# Patient Record
Sex: FEMALE | Race: WHITE | Employment: FULL TIME | URBAN - METROPOLITAN AREA
[De-identification: names, ages, dates, MRNs, and addresses within clinical notes are randomized per-mention and may not be internally consistent; named-entity substitution may affect disease eponyms.]

---

## 2019-10-03 ENCOUNTER — OP HISTORICAL/CONVERTED ENCOUNTER (OUTPATIENT)
Dept: OTHER | Age: 49
End: 2019-10-03

## 2019-10-22 ENCOUNTER — OP HISTORICAL/CONVERTED ENCOUNTER (OUTPATIENT)
Dept: OTHER | Age: 49
End: 2019-10-22

## 2020-04-16 ENCOUNTER — OP HISTORICAL/CONVERTED ENCOUNTER (OUTPATIENT)
Dept: OTHER | Age: 50
End: 2020-04-16

## 2020-09-12 VITALS — HEIGHT: 67 IN | BODY MASS INDEX: 21.46 KG/M2

## 2020-09-12 PROBLEM — L98.9 LESION OF NECK: Status: ACTIVE | Noted: 2020-09-12

## 2020-09-12 PROBLEM — N92.6 IRREGULAR PERIODS: Status: ACTIVE | Noted: 2020-09-12

## 2020-09-12 PROBLEM — N88.8 CERVICAL CYST: Status: ACTIVE | Noted: 2020-09-12

## 2020-09-12 PROBLEM — N87.9 CERVICAL DYSPLASIA: Status: ACTIVE | Noted: 2020-09-12

## 2020-09-12 PROBLEM — N81.4 UTERINE PROLAPSE: Status: ACTIVE | Noted: 2020-09-12

## 2020-09-15 VITALS — HEIGHT: 67 IN | BODY MASS INDEX: 21.46 KG/M2

## 2020-09-16 ENCOUNTER — OFFICE VISIT (OUTPATIENT)
Dept: OBGYN CLINIC | Age: 50
End: 2020-09-16
Payer: COMMERCIAL

## 2020-09-16 VITALS
HEIGHT: 67 IN | DIASTOLIC BLOOD PRESSURE: 80 MMHG | BODY MASS INDEX: 22.99 KG/M2 | WEIGHT: 146.5 LBS | SYSTOLIC BLOOD PRESSURE: 148 MMHG

## 2020-09-16 DIAGNOSIS — Z12.31 VISIT FOR SCREENING MAMMOGRAM: Primary | ICD-10-CM

## 2020-09-16 DIAGNOSIS — N95.1 PERIMENOPAUSE: ICD-10-CM

## 2020-09-16 DIAGNOSIS — Z12.4 SCREENING FOR MALIGNANT NEOPLASM OF CERVIX: Primary | ICD-10-CM

## 2020-09-16 DIAGNOSIS — Z01.419 ROUTINE GYNECOLOGICAL EXAMINATION: ICD-10-CM

## 2020-09-16 PROCEDURE — 77067 SCR MAMMO BI INCL CAD: CPT | Performed by: OBSTETRICS & GYNECOLOGY

## 2020-09-16 PROCEDURE — 77063 BREAST TOMOSYNTHESIS BI: CPT | Performed by: OBSTETRICS & GYNECOLOGY

## 2020-09-16 PROCEDURE — 99396 PREV VISIT EST AGE 40-64: CPT | Performed by: OBSTETRICS & GYNECOLOGY

## 2020-09-16 NOTE — PROGRESS NOTES
Humaira Mo is a , 48 y.o. female   Patient's last menstrual period was 2020. She presents for her annual    She is having no significant problems. Menstrual status:  Her periods are: normal. Cycles are: often irregular with no apparent pattern. She does not have dysmenorrhea. Having some menopausal sxs    Medical conditions:  Since her last annual GYN exam about one year ago, she has not the following changes in her health history: none. Past Medical History:   Diagnosis Date    Cervical cyst 2020    Cervical dysplasia 2020    HTN (hypertension)     Irregular periods 2020    Lesion of neck 2020    Uterine prolapse 2020     Past Surgical History:   Procedure Laterality Date    HX GYN      colpo    HX GYN      cryo    HX TONSIL AND ADENOIDECTOMY         Prior to Admission medications    Medication Sig Start Date End Date Taking? Authorizing Provider   labetalol (NORMODYNE) 100 mg tablet Take 1 Tab by mouth every twelve (12) hours. 10/1/11  Yes Deepthi Cao MD   triamterene-hydrochlorothiazide (DYAZIDE) 37.5-25 mg per capsule Take  by mouth daily. Yes Provider, Historical   oxycodone-acetaminophen (PERCOCET) 5-325 mg per tablet Take 1 Tab by mouth every four (4) hours as needed. 10/1/11   Asehly Rayo MD       Allergies   Allergen Reactions    Ace Inhibitors Angioedema          Tobacco History:  reports that she has never smoked. She has never used smokeless tobacco.  Alcohol Abuse:  reports previous alcohol use. Drug Abuse:  reports no history of drug use. Family Medical/Cancer History:   Family History   Problem Relation Age of Onset    Breast Cancer Other     Hypertension Other     Diabetes Other     Colon Cancer Other           Review of Systems   Constitutional: Negative for chills, fever, malaise/fatigue and weight loss. HENT: Negative for congestion, ear pain, sinus pain and tinnitus.     Eyes: Negative for blurred vision and double vision. Respiratory: Negative for cough, shortness of breath and wheezing. Cardiovascular: Negative for chest pain and palpitations. Gastrointestinal: Negative for abdominal pain, blood in stool, constipation, diarrhea, heartburn, nausea and vomiting. Genitourinary: Negative for dysuria, flank pain, frequency, hematuria and urgency. Musculoskeletal: Negative for joint pain and myalgias. Skin: Negative for itching and rash. Neurological: Negative for dizziness, weakness and headaches. Psychiatric/Behavioral: Negative for depression, memory loss and suicidal ideas. The patient is not nervous/anxious and does not have insomnia. Physical Exam  Constitutional:       Appearance: Normal appearance. HENT:      Head: Normocephalic and atraumatic. Cardiovascular:      Rate and Rhythm: Normal rate. Heart sounds: Normal heart sounds. Pulmonary:      Effort: Pulmonary effort is normal.      Breath sounds: Normal breath sounds. Chest:      Breasts:         Right: Normal.         Left: Normal.   Abdominal:      General: Abdomen is flat. Palpations: Abdomen is soft. Genitourinary:     General: Normal vulva. Vagina: Normal.      Cervix: Normal.      Uterus: Normal.       Adnexa: Right adnexa normal and left adnexa normal.      Rectum: Normal.      Comments: PAP Obtained  Neurological:      Mental Status: She is alert. Psychiatric:         Mood and Affect: Mood normal.         Behavior: Behavior normal.         Thought Content: Thought content normal.          Visit Vitals  BP (!) 148/80 (BP 1 Location: Left arm, BP Patient Position: Sitting)   Ht 5' 6.5\" (1.689 m)   Wt 146 lb 8 oz (66.5 kg)   LMP 08/19/2020   BMI 23.29 kg/m²         Assessment:  Diagnoses and all orders for this visit:    1. Screening for malignant neoplasm of cervix  -     PAP, LB, RFX HPV [id number])    2. Routine gynecological examination  -     PAP, LB, RFX HPV [id number])    3. Perimenopause        Plan:Questions addressed  Counseled re: diet, exercise, healthy lifestyle  Return for Annual  Rec annual mammogram        Follow-up and Dispositions    · Return for 1 yr annual, 1 yr mammo.

## 2020-09-23 LAB
CYTOLOGIST CVX/VAG CYTO: NORMAL
CYTOLOGY CVX/VAG DOC CYTO: NORMAL
DX ICD CODE: NORMAL
LABCORP, 190119: NORMAL
Lab: NORMAL
OTHER STN SPEC: NORMAL
STAT OF ADQ CVX/VAG CYTO-IMP: NORMAL

## 2020-11-04 ENCOUNTER — TRANSCRIBE ORDER (OUTPATIENT)
Dept: SCHEDULING | Age: 50
End: 2020-11-04

## 2020-11-04 DIAGNOSIS — R59.0 VIRCHOW'S NODE: Primary | ICD-10-CM

## 2022-03-18 PROBLEM — L98.9 LESION OF NECK: Status: ACTIVE | Noted: 2020-09-12

## 2022-03-18 PROBLEM — N81.4 UTERINE PROLAPSE: Status: ACTIVE | Noted: 2020-09-12

## 2022-03-19 PROBLEM — N92.6 IRREGULAR PERIODS: Status: ACTIVE | Noted: 2020-09-12

## 2022-03-20 PROBLEM — N88.8 CERVICAL CYST: Status: ACTIVE | Noted: 2020-09-12

## 2022-03-20 PROBLEM — N87.9 CERVICAL DYSPLASIA: Status: ACTIVE | Noted: 2020-09-12

## 2022-07-01 ENCOUNTER — OFFICE VISIT (OUTPATIENT)
Dept: OBGYN CLINIC | Age: 52
End: 2022-07-01
Payer: COMMERCIAL

## 2022-07-01 VITALS
DIASTOLIC BLOOD PRESSURE: 70 MMHG | WEIGHT: 146.38 LBS | BODY MASS INDEX: 22.98 KG/M2 | SYSTOLIC BLOOD PRESSURE: 138 MMHG | HEIGHT: 67 IN

## 2022-07-01 DIAGNOSIS — N95.1 PERIMENOPAUSAL: ICD-10-CM

## 2022-07-01 DIAGNOSIS — N95.1 MENOPAUSAL SYNDROME: ICD-10-CM

## 2022-07-01 DIAGNOSIS — Z12.4 SCREENING FOR MALIGNANT NEOPLASM OF CERVIX: Primary | ICD-10-CM

## 2022-07-01 DIAGNOSIS — Z01.419 ROUTINE GYNECOLOGICAL EXAMINATION: ICD-10-CM

## 2022-07-01 PROCEDURE — 99396 PREV VISIT EST AGE 40-64: CPT | Performed by: OBSTETRICS & GYNECOLOGY

## 2022-07-01 NOTE — PROGRESS NOTES
Kellie Soriano is a , 46 y.o. female   Patient's last menstrual period was 2022 (exact date). She presents for her annual    She is having no significant problems. DWP perimenopausal sxs- skipping some cycles    Had CT scan recently- results reviewed- mentioned cyst in cervical region- DWP c/w nabothian cysts      Menstrual status:  Cycles are usually regular with a 26-32 day interval with 3-7 day duration. Flow: light. She does not have dysmenorrhea. Medical conditions:  Since her last annual GYN exam about one year ago, she has not the following changes in her health history: none. Mammogram History:    BLANK Results (most recent):  Results from Office Visit encounter on 20    BLANK 3D JAREK W MAMMO BI SCREENING INCL CAD       DEXA Results (most recent):  No results found for this or any previous visit. Past Medical History:   Diagnosis Date    Cervical cyst 2020    Cervical dysplasia 2020    HTN (hypertension)     Irregular periods 2020    Lesion of neck 2020    Uterine prolapse 2020     Past Surgical History:   Procedure Laterality Date    HX GYN      colpo    HX GYN      cryo    HX TONSIL AND ADENOIDECTOMY         Prior to Admission medications    Medication Sig Start Date End Date Taking? Authorizing Provider   triamterene-hydrochlorothiazide (DYAZIDE) 37.5-25 mg per capsule Take  by mouth daily. Yes Provider, Historical   labetalol (NORMODYNE) 100 mg tablet Take 1 Tab by mouth every twelve (12) hours. 10/1/11   Esteban Raza MD       Allergies   Allergen Reactions    Ace Inhibitors Angioedema          Tobacco History:  reports that she has never smoked. She has never used smokeless tobacco.  Alcohol use:  reports previous alcohol use. Drug use:  reports no history of drug use.     Family Medical/Cancer History:   Family History   Problem Relation Age of Onset    Breast Cancer Other     Hypertension Other     Diabetes Other     Colon Cancer Other           Review of Systems   Constitutional: Negative for chills, fever, malaise/fatigue and weight loss. HENT: Negative for congestion, ear pain, sinus pain and tinnitus. Eyes: Negative for blurred vision and double vision. Respiratory: Negative for cough, shortness of breath and wheezing. Cardiovascular: Negative for chest pain and palpitations. Gastrointestinal: Negative for abdominal pain, blood in stool, constipation, diarrhea, heartburn, nausea and vomiting. Genitourinary: Negative for dysuria, flank pain, frequency, hematuria and urgency. Musculoskeletal: Negative for joint pain and myalgias. Skin: Negative for itching and rash. Neurological: Negative for dizziness, weakness and headaches. Psychiatric/Behavioral: Negative for depression, memory loss and suicidal ideas. The patient is not nervous/anxious and does not have insomnia. Physical Exam  Constitutional:       Appearance: Normal appearance. HENT:      Head: Normocephalic and atraumatic. Cardiovascular:      Rate and Rhythm: Normal rate. Heart sounds: Normal heart sounds. Pulmonary:      Effort: Pulmonary effort is normal.      Breath sounds: Normal breath sounds. Chest:   Breasts:      Right: Normal.      Left: Normal.       Abdominal:      General: Abdomen is flat. Palpations: Abdomen is soft. Genitourinary:     General: Normal vulva. Vagina: Normal.      Cervix: Normal.      Uterus: Normal.       Adnexa: Right adnexa normal and left adnexa normal.      Rectum: Normal.      Comments: PAP Obtained  Neurological:      Mental Status: She is alert. Psychiatric:         Mood and Affect: Mood normal.         Behavior: Behavior normal.         Thought Content:  Thought content normal.          Visit Vitals  /70 (BP 1 Location: Left upper arm, BP Patient Position: Sitting, BP Cuff Size: Small adult)   Ht 5' 6.5\" (1.689 m)   Wt 146 lb 6 oz (66.4 kg)   LMP 07/01/2022 (Exact Date)   BMI 23.27 kg/m²         Assessment:   Diagnoses and all orders for this visit:    1. Screening for malignant neoplasm of cervix  -     PAP IG, RFX APTIMA HPV ASCUS ([id number])    2. Routine gynecological examination  -     PAP IG, RFX APTIMA HPV ASCUS ([id number])    3. Menopausal syndrome    4. Perimenopausal        Plan: Questions addressed  Counseled re: diet, exercise, healthy lifestyle  Return for Annual  Rec annual mammogram        Follow-up and Dispositions    · Return for Mammogram, 1 yr annual, 1 yr mammo.

## 2022-07-01 NOTE — PROGRESS NOTES
Chief Complaint   Patient presents with    Annual Exam     Mammo alexia here on 7-06-22     Visit Vitals  /70 (BP 1 Location: Left upper arm, BP Patient Position: Sitting, BP Cuff Size: Small adult)   Ht 5' 6.5\" (1.689 m)   Wt 146 lb 6 oz (66.4 kg)   LMP 07/01/2022 (Exact Date)   BMI 23.27 kg/m²

## 2022-07-08 LAB
CYTOLOGIST CVX/VAG CYTO: NORMAL
CYTOLOGY CVX/VAG DOC CYTO: NORMAL
CYTOLOGY CVX/VAG DOC THIN PREP: NORMAL
DX ICD CODE: NORMAL
LABCORP, 190119: NORMAL
Lab: NORMAL
OTHER STN SPEC: NORMAL
STAT OF ADQ CVX/VAG CYTO-IMP: NORMAL

## 2024-08-04 ENCOUNTER — APPOINTMENT (OUTPATIENT)
Facility: HOSPITAL | Age: 54
DRG: 275 | End: 2024-08-04
Payer: COMMERCIAL

## 2024-08-04 ENCOUNTER — APPOINTMENT (OUTPATIENT)
Facility: HOSPITAL | Age: 54
DRG: 275 | End: 2024-08-04
Attending: INTERNAL MEDICINE
Payer: COMMERCIAL

## 2024-08-04 ENCOUNTER — HOSPITAL ENCOUNTER (INPATIENT)
Facility: HOSPITAL | Age: 54
LOS: 10 days | Discharge: HOME | DRG: 275 | End: 2024-08-14
Attending: EMERGENCY MEDICINE | Admitting: INTERNAL MEDICINE
Payer: COMMERCIAL

## 2024-08-04 DIAGNOSIS — I46.9 CARDIAC ARREST WITH SUCCESSFUL RESUSCITATION (HCC): Primary | ICD-10-CM

## 2024-08-04 DIAGNOSIS — R60.0 EDEMA AT INJECTION SITE: ICD-10-CM

## 2024-08-04 DIAGNOSIS — I42.9 CARDIOMYOPATHY, UNSPECIFIED TYPE (HCC): ICD-10-CM

## 2024-08-04 DIAGNOSIS — R00.0 TACHYCARDIA: ICD-10-CM

## 2024-08-04 DIAGNOSIS — I47.20 V-TACH (HCC): ICD-10-CM

## 2024-08-04 DIAGNOSIS — I21.3 STEMI (ST ELEVATION MYOCARDIAL INFARCTION) (HCC): ICD-10-CM

## 2024-08-04 DIAGNOSIS — I50.21 ACUTE SYSTOLIC CONGESTIVE HEART FAILURE (HCC): ICD-10-CM

## 2024-08-04 PROBLEM — I49.01 CARDIAC ARREST WITH VENTRICULAR FIBRILLATION (HCC): Status: ACTIVE | Noted: 2024-08-04

## 2024-08-04 LAB
ABO + RH BLD: NORMAL
ACT BLD: 97 SEC (ref 74–125)
ALBUMIN SERPL-MCNC: 3 G/DL (ref 3.5–5)
ALBUMIN/GLOB SERPL: 0.8 (ref 1.1–2.2)
ALP SERPL-CCNC: 155 U/L (ref 45–117)
ALT SERPL-CCNC: 445 U/L (ref 12–78)
ANION GAP SERPL CALC-SCNC: 12 MMOL/L (ref 5–15)
APPEARANCE UR: ABNORMAL
ARTERIAL PATENCY WRIST A: ABNORMAL
AST SERPL W P-5'-P-CCNC: 509 U/L (ref 15–37)
BACTERIA URNS QL MICRO: NEGATIVE /HPF
BASE DEFICIT BLD-SCNC: 8.4 MMOL/L
BASE DEFICIT BLDA-SCNC: 7.5 MMOL/L
BASOPHILS # BLD: 0.1 K/UL (ref 0–0.1)
BASOPHILS NFR BLD: 1 % (ref 0–1)
BDY SITE: ABNORMAL
BILIRUB SERPL-MCNC: 0.6 MG/DL (ref 0.2–1)
BILIRUB UR QL: NEGATIVE
BLOOD GROUP ANTIBODIES SERPL: NEGATIVE
BNP SERPL-MCNC: 1208 PG/ML
BUN SERPL-MCNC: 17 MG/DL (ref 6–20)
BUN/CREAT SERPL: 15 (ref 12–20)
CA-I BLD-MCNC: 1 MMOL/L (ref 1.12–1.32)
CA-I BLD-MCNC: 8.5 MG/DL (ref 8.5–10.1)
CHLORIDE BLD-SCNC: 107 MMOL/L (ref 98–107)
CHLORIDE SERPL-SCNC: 107 MMOL/L (ref 97–108)
CO2 BLD-SCNC: 18 MMOL/L
CO2 SERPL-SCNC: 18 MMOL/L (ref 21–32)
COHGB MFR BLD: 0.3 % (ref 1–2)
COLOR UR: ABNORMAL
CREAT SERPL-MCNC: 1.17 MG/DL (ref 0.7–1.3)
CREAT UR-MCNC: 0.88 MG/DL (ref 0.6–1.3)
DIFFERENTIAL METHOD BLD: ABNORMAL
ECHO AO ASC DIAM: 3.7 CM
ECHO AO ASCENDING AORTA INDEX: 1.96 CM/M2
ECHO AO ROOT DIAM: 3.8 CM
ECHO AO ROOT INDEX: 2.01 CM/M2
ECHO AR MAX VEL PISA: 3.8 M/S
ECHO AV AREA PEAK VELOCITY: 1.3 CM2
ECHO AV AREA VTI: 1.1 CM2
ECHO AV AREA/BSA PEAK VELOCITY: 0.7 CM2/M2
ECHO AV AREA/BSA VTI: 0.6 CM2/M2
ECHO AV MEAN GRADIENT: 8 MMHG
ECHO AV MEAN VELOCITY: 1.3 M/S
ECHO AV PEAK GRADIENT: 14 MMHG
ECHO AV PEAK VELOCITY: 1.9 M/S
ECHO AV REGURGITANT PHT: 556 MS
ECHO AV VELOCITY RATIO: 0.32
ECHO AV VTI: 23.9 CM
ECHO BSA: 1.92 M2
ECHO BSA: 1.92 M2
ECHO EST RA PRESSURE: 15 MMHG
ECHO LA AREA 4C: 22.4 CM2
ECHO LA MAJOR AXIS: 6.8 CM
ECHO LA VOL MOD A4C: 63 ML (ref 18–58)
ECHO LA VOLUME INDEX MOD A4C: 33 ML/M2 (ref 16–34)
ECHO LV E' LATERAL VELOCITY: 6 CM/S
ECHO LV E' SEPTAL VELOCITY: 4 CM/S
ECHO LV EDV A2C: 111 ML
ECHO LV EDV A4C: 124 ML
ECHO LV EDV INDEX A4C: 66 ML/M2
ECHO LV EDV NDEX A2C: 59 ML/M2
ECHO LV EJECTION FRACTION A2C: 54 %
ECHO LV EJECTION FRACTION A4C: 39 %
ECHO LV EJECTION FRACTION BIPLANE: 51 % (ref 55–100)
ECHO LV ESV A2C: 51 ML
ECHO LV ESV A4C: 75 ML
ECHO LV ESV INDEX A2C: 27 ML/M2
ECHO LV ESV INDEX A4C: 40 ML/M2
ECHO LV FRACTIONAL SHORTENING: 13 % (ref 28–44)
ECHO LV INTERNAL DIMENSION DIASTOLE INDEX: 3.33 CM/M2
ECHO LV INTERNAL DIMENSION DIASTOLIC: 6.3 CM (ref 4.2–5.9)
ECHO LV INTERNAL DIMENSION SYSTOLIC INDEX: 2.91 CM/M2
ECHO LV INTERNAL DIMENSION SYSTOLIC: 5.5 CM
ECHO LV IVSD: 1.5 CM (ref 0.6–1)
ECHO LV MASS 2D: 505.9 G (ref 88–224)
ECHO LV MASS INDEX 2D: 267.7 G/M2 (ref 49–115)
ECHO LV POSTERIOR WALL DIASTOLIC: 1.7 CM (ref 0.6–1)
ECHO LV RELATIVE WALL THICKNESS RATIO: 0.54
ECHO LVOT AREA: 4.2 CM2
ECHO LVOT AV VTI INDEX: 0.27
ECHO LVOT DIAM: 2.3 CM
ECHO LVOT MEAN GRADIENT: 1 MMHG
ECHO LVOT PEAK GRADIENT: 2 MMHG
ECHO LVOT PEAK VELOCITY: 0.6 M/S
ECHO LVOT STROKE VOLUME INDEX: 14.3 ML/M2
ECHO LVOT SV: 27 ML
ECHO LVOT VTI: 6.5 CM
ECHO MV E VELOCITY: 0.37 M/S
ECHO MV E/E' LATERAL: 6.17
ECHO MV E/E' RATIO (AVERAGED): 7.71
ECHO MV E/E' SEPTAL: 9.25
ECHO MV REGURGITANT PEAK GRADIENT: 21 MMHG
ECHO MV REGURGITANT PEAK VELOCITY: 2.3 M/S
ECHO MV REGURGITANT VTIA: 51.5 CM
ECHO PV MAX VELOCITY: 0.4 M/S
ECHO PV MEAN GRADIENT: 0 MMHG
ECHO PV MEAN VELOCITY: 0.3 M/S
ECHO PV PEAK GRADIENT: 1 MMHG
ECHO PV VTI: 5.7 CM
ECHO RA AREA 4C: 15.2 CM2
ECHO RA END SYSTOLIC VOLUME APICAL 4 CHAMBER INDEX BSA: 23 ML/M2
ECHO RA VOLUME: 43 ML
ECHO RIGHT VENTRICULAR SYSTOLIC PRESSURE (RVSP): 32 MMHG
ECHO RV BASAL DIMENSION: 3.4 CM
ECHO RV MID DIMENSION: 3.2 CM
ECHO TV REGURGITANT MAX VELOCITY: 2.06 M/S
ECHO TV REGURGITANT PEAK GRADIENT: 17 MMHG
EKG ATRIAL RATE: 149 BPM
EKG DIAGNOSIS: NORMAL
EKG P AXIS: 74 DEGREES
EKG P-R INTERVAL: 96 MS
EKG Q-T INTERVAL: 346 MS
EKG QRS DURATION: 152 MS
EKG QTC CALCULATION (BAZETT): 544 MS
EKG R AXIS: -68 DEGREES
EKG T AXIS: -80 DEGREES
EKG VENTRICULAR RATE: 149 BPM
EOSINOPHIL # BLD: 0.2 K/UL (ref 0–0.4)
EOSINOPHIL NFR BLD: 1 % (ref 0–7)
EPITH CASTS URNS QL MICRO: ABNORMAL /LPF
ERYTHROCYTE [DISTWIDTH] IN BLOOD BY AUTOMATED COUNT: 13.1 % (ref 11.5–14.5)
FIO2 ON VENT: 0.6 %
FLUAV RNA SPEC QL NAA+PROBE: NOT DETECTED
FLUBV RNA SPEC QL NAA+PROBE: NOT DETECTED
GAS FLOW.O2 SETTING OXYMISER: 16
GLOBULIN SER CALC-MCNC: 3.6 G/DL (ref 2–4)
GLUCOSE BLD STRIP.AUTO-MCNC: 161 MG/DL (ref 65–100)
GLUCOSE BLD STRIP.AUTO-MCNC: 175 MG/DL (ref 65–100)
GLUCOSE BLD STRIP.AUTO-MCNC: 286 MG/DL (ref 65–100)
GLUCOSE SERPL-MCNC: 254 MG/DL (ref 65–100)
GLUCOSE UR STRIP.AUTO-MCNC: 150 MG/DL
GRAN CASTS URNS QL MICRO: ABNORMAL /LPF
HCO3 BLD-SCNC: 18.1 MMOL/L (ref 19–28)
HCO3 BLDA-SCNC: 17 MMOL/L (ref 22–26)
HCT VFR BLD AUTO: 46.8 % (ref 36.6–50.3)
HGB BLD-MCNC: 15.2 G/DL (ref 12.1–17)
HGB UR QL STRIP: ABNORMAL
HYALINE CASTS URNS QL MICRO: ABNORMAL /LPF (ref 0–5)
IMM GRANULOCYTES # BLD AUTO: 0.2 K/UL (ref 0–0.04)
IMM GRANULOCYTES NFR BLD AUTO: 1 % (ref 0–0.5)
KETONES UR QL STRIP.AUTO: NEGATIVE MG/DL
LACTATE BLD-SCNC: 5.3 MMOL/L (ref 0.4–2)
LEUKOCYTE ESTERASE UR QL STRIP.AUTO: NEGATIVE
LYMPHOCYTES # BLD: 4.3 K/UL (ref 0.8–3.5)
LYMPHOCYTES NFR BLD: 30 % (ref 12–49)
MAGNESIUM SERPL-MCNC: 2.9 MG/DL (ref 1.6–2.4)
MCH RBC QN AUTO: 28.4 PG (ref 26–34)
MCHC RBC AUTO-ENTMCNC: 32.5 G/DL (ref 30–36.5)
MCV RBC AUTO: 87.3 FL (ref 80–99)
METHGB MFR BLD: 0.4 % (ref 0–1.4)
MONOCYTES # BLD: 0.5 K/UL (ref 0–1)
MONOCYTES NFR BLD: 3 % (ref 5–13)
MRSA DNA SPEC QL NAA+PROBE: NOT DETECTED
MUCOUS THREADS URNS QL MICRO: ABNORMAL /LPF
NEUTS SEG # BLD: 9.3 K/UL (ref 1.8–8)
NEUTS SEG NFR BLD: 64 % (ref 32–75)
NITRITE UR QL STRIP.AUTO: NEGATIVE
NRBC # BLD: 0 K/UL (ref 0–0.01)
NRBC BLD-RTO: 0 PER 100 WBC
OXYHGB MFR BLD: 98.3 % (ref 95–99)
PCO2 BLD: 39.5 MMHG (ref 35–45)
PCO2 BLDA: 34 MMHG (ref 35–45)
PEEP RESPIRATORY: 5
PERFORMED BY:: ABNORMAL
PERFORMED BY:: NORMAL
PH BLD: 7.27 (ref 7.35–7.45)
PH BLDA: 7.32 (ref 7.35–7.45)
PH UR STRIP: 7 (ref 5–8)
PLATELET # BLD AUTO: 378 K/UL (ref 150–400)
PMV BLD AUTO: 9.7 FL (ref 8.9–12.9)
PO2 BLD: 93 MMHG (ref 75–100)
PO2 BLDA: 231 MMHG (ref 80–100)
POTASSIUM BLD-SCNC: 4.9 MMOL/L (ref 3.5–5.5)
POTASSIUM SERPL-SCNC: 3.5 MMOL/L (ref 3.5–5.1)
PROT SERPL-MCNC: 6.6 G/DL (ref 6.4–8.2)
PROT UR STRIP-MCNC: 100 MG/DL
RBC # BLD AUTO: 5.36 M/UL (ref 4.1–5.7)
RBC #/AREA URNS HPF: ABNORMAL /HPF (ref 0–5)
SAO2 % BLD: 96 %
SAO2 % BLD: 99 % (ref 95–99)
SAO2% DEVICE SAO2% SENSOR NAME: ABNORMAL
SARS-COV-2 RNA RESP QL NAA+PROBE: NOT DETECTED
SODIUM BLD-SCNC: 140 MMOL/L (ref 136–145)
SODIUM SERPL-SCNC: 137 MMOL/L (ref 136–145)
SP GR UR REFRACTOMETRY: 1.02 (ref 1–1.03)
SPECIMEN EXP DATE BLD: NORMAL
SPECIMEN SITE: ABNORMAL
SPECIMEN SITE: ABNORMAL
TROPONIN I SERPL HS-MCNC: 76 NG/L (ref 0–76)
TSH SERPL DL<=0.05 MIU/L-ACNC: 1.21 UIU/ML (ref 0.36–3.74)
URINE CULTURE IF INDICATED: ABNORMAL
UROBILINOGEN UR QL STRIP.AUTO: 0.1 EU/DL (ref 0.1–1)
VENTILATION MODE VENT: ABNORMAL
VT SETTING VENT: 400
WBC # BLD AUTO: 14.6 K/UL (ref 4.1–11.1)
WBC URNS QL MICRO: ABNORMAL /HPF (ref 0–4)

## 2024-08-04 PROCEDURE — 6360000002 HC RX W HCPCS: Performed by: INTERNAL MEDICINE

## 2024-08-04 PROCEDURE — 83880 ASSAY OF NATRIURETIC PEPTIDE: CPT

## 2024-08-04 PROCEDURE — 6370000000 HC RX 637 (ALT 250 FOR IP): Performed by: INTERNAL MEDICINE

## 2024-08-04 PROCEDURE — 2500000003 HC RX 250 WO HCPCS: Performed by: INTERNAL MEDICINE

## 2024-08-04 PROCEDURE — C1887 CATHETER, GUIDING: HCPCS | Performed by: INTERNAL MEDICINE

## 2024-08-04 PROCEDURE — 93454 CORONARY ARTERY ANGIO S&I: CPT | Performed by: INTERNAL MEDICINE

## 2024-08-04 PROCEDURE — 94002 VENT MGMT INPAT INIT DAY: CPT

## 2024-08-04 PROCEDURE — 70450 CT HEAD/BRAIN W/O DYE: CPT

## 2024-08-04 PROCEDURE — 86900 BLOOD TYPING SEROLOGIC ABO: CPT

## 2024-08-04 PROCEDURE — 2000000000 HC ICU R&B

## 2024-08-04 PROCEDURE — 82962 GLUCOSE BLOOD TEST: CPT

## 2024-08-04 PROCEDURE — 83735 ASSAY OF MAGNESIUM: CPT

## 2024-08-04 PROCEDURE — 94761 N-INVAS EAR/PLS OXIMETRY MLT: CPT

## 2024-08-04 PROCEDURE — 85347 COAGULATION TIME ACTIVATED: CPT

## 2024-08-04 PROCEDURE — 71045 X-RAY EXAM CHEST 1 VIEW: CPT

## 2024-08-04 PROCEDURE — 2500000003 HC RX 250 WO HCPCS: Performed by: EMERGENCY MEDICINE

## 2024-08-04 PROCEDURE — 2700000000 HC OXYGEN THERAPY PER DAY

## 2024-08-04 PROCEDURE — 86850 RBC ANTIBODY SCREEN: CPT

## 2024-08-04 PROCEDURE — 5A2204Z RESTORATION OF CARDIAC RHYTHM, SINGLE: ICD-10-PCS | Performed by: EMERGENCY MEDICINE

## 2024-08-04 PROCEDURE — 36600 WITHDRAWAL OF ARTERIAL BLOOD: CPT

## 2024-08-04 PROCEDURE — 6360000002 HC RX W HCPCS: Performed by: EMERGENCY MEDICINE

## 2024-08-04 PROCEDURE — 81001 URINALYSIS AUTO W/SCOPE: CPT

## 2024-08-04 PROCEDURE — 6370000000 HC RX 637 (ALT 250 FOR IP): Performed by: EMERGENCY MEDICINE

## 2024-08-04 PROCEDURE — 4A023N7 MEASUREMENT OF CARDIAC SAMPLING AND PRESSURE, LEFT HEART, PERCUTANEOUS APPROACH: ICD-10-PCS | Performed by: INTERNAL MEDICINE

## 2024-08-04 PROCEDURE — 86901 BLOOD TYPING SEROLOGIC RH(D): CPT

## 2024-08-04 PROCEDURE — 93567 NJX CAR CTH SPRVLV AORTGRPHY: CPT | Performed by: INTERNAL MEDICINE

## 2024-08-04 PROCEDURE — C1894 INTRO/SHEATH, NON-LASER: HCPCS | Performed by: INTERNAL MEDICINE

## 2024-08-04 PROCEDURE — 2709999900 HC NON-CHARGEABLE SUPPLY: Performed by: INTERNAL MEDICINE

## 2024-08-04 PROCEDURE — 5A1945Z RESPIRATORY VENTILATION, 24-96 CONSECUTIVE HOURS: ICD-10-PCS | Performed by: EMERGENCY MEDICINE

## 2024-08-04 PROCEDURE — C1760 CLOSURE DEV, VASC: HCPCS | Performed by: INTERNAL MEDICINE

## 2024-08-04 PROCEDURE — 2580000003 HC RX 258: Performed by: INTERNAL MEDICINE

## 2024-08-04 PROCEDURE — 93308 TTE F-UP OR LMTD: CPT

## 2024-08-04 PROCEDURE — 84132 ASSAY OF SERUM POTASSIUM: CPT

## 2024-08-04 PROCEDURE — 80053 COMPREHEN METABOLIC PANEL: CPT

## 2024-08-04 PROCEDURE — 96374 THER/PROPH/DIAG INJ IV PUSH: CPT

## 2024-08-04 PROCEDURE — 6360000004 HC RX CONTRAST MEDICATION: Performed by: INTERNAL MEDICINE

## 2024-08-04 PROCEDURE — 93005 ELECTROCARDIOGRAM TRACING: CPT | Performed by: EMERGENCY MEDICINE

## 2024-08-04 PROCEDURE — 87641 MR-STAPH DNA AMP PROBE: CPT

## 2024-08-04 PROCEDURE — B2111ZZ FLUOROSCOPY OF MULTIPLE CORONARY ARTERIES USING LOW OSMOLAR CONTRAST: ICD-10-PCS | Performed by: INTERNAL MEDICINE

## 2024-08-04 PROCEDURE — B3101ZZ FLUOROSCOPY OF THORACIC AORTA USING LOW OSMOLAR CONTRAST: ICD-10-PCS | Performed by: INTERNAL MEDICINE

## 2024-08-04 PROCEDURE — 82330 ASSAY OF CALCIUM: CPT

## 2024-08-04 PROCEDURE — 87636 SARSCOV2 & INF A&B AMP PRB: CPT

## 2024-08-04 PROCEDURE — 82947 ASSAY GLUCOSE BLOOD QUANT: CPT

## 2024-08-04 PROCEDURE — 83036 HEMOGLOBIN GLYCOSYLATED A1C: CPT

## 2024-08-04 PROCEDURE — 96375 TX/PRO/DX INJ NEW DRUG ADDON: CPT

## 2024-08-04 PROCEDURE — 84443 ASSAY THYROID STIM HORMONE: CPT

## 2024-08-04 PROCEDURE — 83605 ASSAY OF LACTIC ACID: CPT

## 2024-08-04 PROCEDURE — C1769 GUIDE WIRE: HCPCS | Performed by: INTERNAL MEDICINE

## 2024-08-04 PROCEDURE — 99285 EMERGENCY DEPT VISIT HI MDM: CPT

## 2024-08-04 PROCEDURE — 82803 BLOOD GASES ANY COMBINATION: CPT

## 2024-08-04 PROCEDURE — 84295 ASSAY OF SERUM SODIUM: CPT

## 2024-08-04 PROCEDURE — 85025 COMPLETE CBC W/AUTO DIFF WBC: CPT

## 2024-08-04 PROCEDURE — 84484 ASSAY OF TROPONIN QUANT: CPT

## 2024-08-04 RX ORDER — SODIUM CHLORIDE 0.9 % (FLUSH) 0.9 %
5-40 SYRINGE (ML) INJECTION PRN
Status: DISCONTINUED | OUTPATIENT
Start: 2024-08-04 | End: 2024-08-14 | Stop reason: HOSPADM

## 2024-08-04 RX ORDER — METOPROLOL TARTRATE 1 MG/ML
INJECTION, SOLUTION INTRAVENOUS PRN
Status: DISCONTINUED | OUTPATIENT
Start: 2024-08-04 | End: 2024-08-04 | Stop reason: HOSPADM

## 2024-08-04 RX ORDER — PROPOFOL 10 MG/ML
5-50 INJECTION, EMULSION INTRAVENOUS CONTINUOUS
Status: DISCONTINUED | OUTPATIENT
Start: 2024-08-04 | End: 2024-08-06

## 2024-08-04 RX ORDER — CARVEDILOL 3.12 MG/1
6.25 TABLET ORAL 2 TIMES DAILY WITH MEALS
Status: DISCONTINUED | OUTPATIENT
Start: 2024-08-04 | End: 2024-08-07

## 2024-08-04 RX ORDER — ASPIRIN 81 MG/1
TABLET, CHEWABLE ORAL PRN
Status: DISCONTINUED | OUTPATIENT
Start: 2024-08-04 | End: 2024-08-04 | Stop reason: HOSPADM

## 2024-08-04 RX ORDER — FENTANYL CITRATE-0.9 % NACL/PF 10 MCG/ML
PLASTIC BAG, INJECTION (ML) INTRAVENOUS CONTINUOUS PRN
Status: COMPLETED | OUTPATIENT
Start: 2024-08-04 | End: 2024-08-04

## 2024-08-04 RX ORDER — ASPIRIN 81 MG/1
TABLET, CHEWABLE ORAL DAILY PRN
Status: COMPLETED | OUTPATIENT
Start: 2024-08-04 | End: 2024-08-04

## 2024-08-04 RX ORDER — FENTANYL CITRATE-0.9 % NACL/PF 10 MCG/ML
PLASTIC BAG, INJECTION (ML) INTRAVENOUS
Status: DISPENSED
Start: 2024-08-04 | End: 2024-08-05

## 2024-08-04 RX ORDER — ACETAMINOPHEN 325 MG/1
650 TABLET ORAL EVERY 4 HOURS PRN
Status: DISCONTINUED | OUTPATIENT
Start: 2024-08-04 | End: 2024-08-14 | Stop reason: HOSPADM

## 2024-08-04 RX ORDER — FENTANYL CITRATE 50 UG/ML
50 INJECTION, SOLUTION INTRAMUSCULAR; INTRAVENOUS
Status: DISCONTINUED | OUTPATIENT
Start: 2024-08-04 | End: 2024-08-09

## 2024-08-04 RX ORDER — LIDOCAINE HYDROCHLORIDE 10 MG/ML
INJECTION, SOLUTION INFILTRATION; PERINEURAL PRN
Status: DISCONTINUED | OUTPATIENT
Start: 2024-08-04 | End: 2024-08-04 | Stop reason: HOSPADM

## 2024-08-04 RX ORDER — PROPOFOL 10 MG/ML
INJECTION, EMULSION INTRAVENOUS CONTINUOUS PRN
Status: COMPLETED | OUTPATIENT
Start: 2024-08-04 | End: 2024-08-04

## 2024-08-04 RX ORDER — SODIUM CHLORIDE 9 MG/ML
INJECTION, SOLUTION INTRAVENOUS PRN
Status: DISCONTINUED | OUTPATIENT
Start: 2024-08-04 | End: 2024-08-14 | Stop reason: HOSPADM

## 2024-08-04 RX ORDER — SPIRONOLACTONE 25 MG/1
25 TABLET ORAL DAILY
Status: DISCONTINUED | OUTPATIENT
Start: 2024-08-04 | End: 2024-08-14 | Stop reason: HOSPADM

## 2024-08-04 RX ORDER — INSULIN LISPRO 100 [IU]/ML
0-8 INJECTION, SOLUTION INTRAVENOUS; SUBCUTANEOUS
Status: DISCONTINUED | OUTPATIENT
Start: 2024-08-04 | End: 2024-08-13

## 2024-08-04 RX ORDER — NOREPINEPHRINE BITARTRATE 0.06 MG/ML
1-100 INJECTION, SOLUTION INTRAVENOUS CONTINUOUS
Status: DISCONTINUED | OUTPATIENT
Start: 2024-08-04 | End: 2024-08-07

## 2024-08-04 RX ORDER — SODIUM CHLORIDE 0.9 % (FLUSH) 0.9 %
5-40 SYRINGE (ML) INJECTION EVERY 12 HOURS SCHEDULED
Status: DISCONTINUED | OUTPATIENT
Start: 2024-08-04 | End: 2024-08-14 | Stop reason: HOSPADM

## 2024-08-04 RX ORDER — HEPARIN SODIUM 1000 [USP'U]/ML
INJECTION, SOLUTION INTRAVENOUS; SUBCUTANEOUS DAILY PRN
Status: COMPLETED | OUTPATIENT
Start: 2024-08-04 | End: 2024-08-04

## 2024-08-04 RX ORDER — GLUCAGON 1 MG/ML
1 KIT INJECTION PRN
Status: DISCONTINUED | OUTPATIENT
Start: 2024-08-04 | End: 2024-08-13

## 2024-08-04 RX ORDER — ASPIRIN 81 MG/1
81 TABLET, CHEWABLE ORAL DAILY
Status: DISCONTINUED | OUTPATIENT
Start: 2024-08-04 | End: 2024-08-14 | Stop reason: HOSPADM

## 2024-08-04 RX ORDER — PROPOFOL 10 MG/ML
INJECTION, EMULSION INTRAVENOUS
Status: DISPENSED
Start: 2024-08-04 | End: 2024-08-05

## 2024-08-04 RX ORDER — DEXTROSE MONOHYDRATE 100 MG/ML
INJECTION, SOLUTION INTRAVENOUS CONTINUOUS PRN
Status: DISCONTINUED | OUTPATIENT
Start: 2024-08-04 | End: 2024-08-13

## 2024-08-04 RX ORDER — SODIUM CHLORIDE 9 MG/ML
INJECTION, SOLUTION INTRAVENOUS CONTINUOUS PRN
Status: COMPLETED | OUTPATIENT
Start: 2024-08-04 | End: 2024-08-04

## 2024-08-04 RX ORDER — ATORVASTATIN CALCIUM 40 MG/1
40 TABLET, FILM COATED ORAL NIGHTLY
Status: DISCONTINUED | OUTPATIENT
Start: 2024-08-04 | End: 2024-08-14 | Stop reason: HOSPADM

## 2024-08-04 RX ORDER — INSULIN LISPRO 100 [IU]/ML
0-4 INJECTION, SOLUTION INTRAVENOUS; SUBCUTANEOUS NIGHTLY
Status: DISCONTINUED | OUTPATIENT
Start: 2024-08-04 | End: 2024-08-13

## 2024-08-04 RX ADMIN — AMIODARONE HYDROCHLORIDE 150 MG: 1.5 INJECTION, SOLUTION INTRAVENOUS at 13:12

## 2024-08-04 RX ADMIN — ATORVASTATIN CALCIUM 40 MG: 40 TABLET, FILM COATED ORAL at 21:15

## 2024-08-04 RX ADMIN — PROPOFOL 40 MCG/KG/MIN: 10 INJECTION, EMULSION INTRAVENOUS at 18:06

## 2024-08-04 RX ADMIN — SODIUM CHLORIDE, PRESERVATIVE FREE 10 ML: 5 INJECTION INTRAVENOUS at 21:16

## 2024-08-04 RX ADMIN — SPIRONOLACTONE 25 MG: 25 TABLET ORAL at 18:22

## 2024-08-04 RX ADMIN — FENTANYL CITRATE 50 MCG/HR: at 13:11

## 2024-08-04 RX ADMIN — AMIODARONE HYDROCHLORIDE 1 MG/MIN: 1.8 INJECTION, SOLUTION INTRAVENOUS at 19:19

## 2024-08-04 RX ADMIN — ASPIRIN 81 MG CHEWABLE TABLET 81 MG: 81 TABLET CHEWABLE at 18:04

## 2024-08-04 RX ADMIN — Medication 5 MCG/MIN: at 18:04

## 2024-08-04 RX ADMIN — PROPOFOL 794 MCG: 10 INJECTION, EMULSION INTRAVENOUS at 13:14

## 2024-08-04 RX ADMIN — ASPIRIN 81 MG 324 MG: 81 TABLET ORAL at 13:24

## 2024-08-04 RX ADMIN — PROPOFOL 40 MCG/KG/MIN: 10 INJECTION, EMULSION INTRAVENOUS at 21:16

## 2024-08-04 ASSESSMENT — PULMONARY FUNCTION TESTS
PIF_VALUE: 18
PIF_VALUE: 17
PIF_VALUE: 17
PIF_VALUE: 19
PIF_VALUE: 17
PIF_VALUE: 17
PIF_VALUE: 14
PIF_VALUE: 17
PIF_VALUE: 20
PIF_VALUE: 17
PIF_VALUE: 17
PIF_VALUE: 16
PIF_VALUE: 18
PIF_VALUE: 19
PIF_VALUE: 18
PIF_VALUE: 16
PIF_VALUE: 18
PIF_VALUE: 18
PIF_VALUE: 17
PIF_VALUE: 18

## 2024-08-04 ASSESSMENT — PAIN SCALES - GENERAL
PAINLEVEL_OUTOF10: 0

## 2024-08-04 NOTE — CARE COORDINATION
08/04/24 1827   Service Assessment   Patient Orientation Sedated   Cognition Other (see comment)  (INTUBATED)   History Provided By Child/Family   Primary Caregiver Self   Accompanied By/Relationship DAUGHTER   Support Systems Spouse/Significant Other;Children;Family Members   Patient's Healthcare Decision Maker is: Legal Next of Kin   PCP Verified by CM No   Prior Functional Level Independent in ADLs/IADLs   Current Functional Level Assistance with the following:;Bathing;Dressing;Toileting;Feeding   Can patient return to prior living arrangement Unknown at present   Ability to make needs known: Unable   Family able to assist with home care needs: No   Would you like for me to discuss the discharge plan with any other family members/significant others, and if so, who? Yes  (DAUGHTERS)     Advance Care Planning     General Advance Care Planning (ACP) Conversation    Date of Conversation: 8/4/2024  Conducted with: Legal next of kin  Other persons present: Sister    Daughter ADELAIDE CSEPEDES    Healthcare Decision Maker:   Primary Decision Maker: SELENA CESPEDES - Child - [phone]    Primary Decision Maker: ZEE CESPEDES - Child - [phone]     Today we documented Decision Maker(s) consistent with Legal Next of Kin hierarchy.    Length of Voluntary ACP Conversation in minutes:  <16 minutes (Non-Billable)    LALIT Ritter      CM met w/ pt's daughter, Adelaide, charted above, at bedside for assessment. Pt lives at charted address w/ her sig other and two daughters. Prior to today she has been indep in ADLs, no dme/hh/rehab hx. No PCP/Rx. CM team will follow closely for dispo.

## 2024-08-04 NOTE — ED TRIAGE NOTES
Pt had a witnessed arrest by family member, cpr started with ems and the pt was then in STEMI after rhythm returned, pt had a breathing tube placed when the pt came in with EMS,

## 2024-08-04 NOTE — ED PROVIDER NOTES
interventions  CASE REVIEW - Hospitalist/Intensivist and Medical Sub-Specialist  TREATMENT RESPONSE -Unchanged   PERFORMED BY - Self    NOTES:  I have spent 30 minutes of critical care time involved in lab review, consultations with specialist, family decision- making, bedside attention and documentation. Time is exclusive of EKG interpretation, imaging interpretation and separately billed procedures.  During this entire length of time I was immediately available to the patient.  Tasha Reardon MD    ED IMPRESSION     1. Cardiac arrest with successful resuscitation (HCC)    2. STEMI (ST elevation myocardial infarction) (HCC)    3. Tachycardia          DISPOSITION/PLAN   DISPOSITION Decision To Admit 08/04/2024 01:37:14 PM    Admit Note: Pt is being admitted by Dr. Salas. The results of their tests and reason(s) for their admission have been discussed with pt and/or available family. They convey agreement and understanding for the need to be admitted and for the admission diagnosis.     I am the Primary Clinician of Record. Tasha Reardon MD (electronically signed)    (Please note that parts of this dictation were completed with voice recognition software. Quite often unanticipated grammatical, syntax, homophones, and other interpretive errors are inadvertently transcribed by the computer software. Please disregards these errors. Please excuse any errors that have escaped final proofreading.)        Tasha Reardon MD  08/04/24 0501

## 2024-08-04 NOTE — H&P
History & Physical    Primary Care Provider: No primary care provider on file.  Source of Information: Patient/family     Chief complaint:   Chief Complaint   Patient presents with    Stemi        History of Presenting Illness:   Dn Loy Friedman is a 54 y.o. female with multiple medical problems including hypertension, and was brought to the emergency department today status post witnessed arrest. Per cardiology note, the patient was sitting at home, and suddenly her eyes rolled back and she became unresponsive. Her lips then turned blue, she began breathing heavily, and the patient's body became \"stiff\". The patient's daughters report that CPR was then started.  Per EMS, the patient was found to be in ventricular fibrillation, received 4 defibrillations with cardioversion.  Per cardiology note, the patient's rhythm was then converted to torsades the point and she was given magnesium sulfate. The patient's rhythm was converted to normal sinus rhythm and ROSC. Per EMS, the patient was then intubated in the field with Rocuronium and Ketamine.  EMS reports that she was then given magnesium in route to the hospital.  While in the ED, the patient had a EKG that showed sinus tachycardia at a rate of 149 bpm no STEMI.  The patient has elevated blood sugar at 254 mg/dL, and elevated white blood cell count of 14.6 K /ul.  CT of the head showed no acute intracranial abnormality, chronic findings.  Chest x-ray shows cardiomegaly, no focal pulmonary process. The patient was then rushed to the cardiac catheter lab by cardiology, the patient was found to have no blockages in her coronary arteries.  The patient then proceeded to have a stat echocardiogram which showed 25% EF, moderate aortic insufficiency, no aortic dissection.  Findings likely secondary to undiagnosed cardiomyopathy.    Per the patient's family, the patient has a history of hypertension, but is noncompliant with her medications.  The patient drinks 1 to 2

## 2024-08-05 ENCOUNTER — APPOINTMENT (OUTPATIENT)
Facility: HOSPITAL | Age: 54
DRG: 275 | End: 2024-08-05
Payer: COMMERCIAL

## 2024-08-05 LAB
ALBUMIN SERPL-MCNC: 3.1 G/DL (ref 3.5–5)
ALBUMIN/GLOB SERPL: 0.9 (ref 1.1–2.2)
ALP SERPL-CCNC: 121 U/L (ref 45–117)
ALT SERPL-CCNC: 387 U/L (ref 12–78)
AMPHET UR QL SCN: NEGATIVE
ANION GAP SERPL CALC-SCNC: 7 MMOL/L (ref 5–15)
ARTERIAL PATENCY WRIST A: YES
AST SERPL W P-5'-P-CCNC: 342 U/L (ref 15–37)
BARBITURATES UR QL SCN: NEGATIVE
BASE DEFICIT BLDA-SCNC: 3.1 MMOL/L
BASOPHILS # BLD: 0 K/UL (ref 0–0.1)
BASOPHILS NFR BLD: 0 % (ref 0–1)
BDY SITE: ABNORMAL
BENZODIAZ UR QL: NEGATIVE
BILIRUB SERPL-MCNC: 0.4 MG/DL (ref 0.2–1)
BNP SERPL-MCNC: 8479 PG/ML
BODY TEMPERATURE: 97.5
BUN SERPL-MCNC: 21 MG/DL (ref 6–20)
BUN/CREAT SERPL: 16 (ref 12–20)
CA-I BLD-MCNC: 8.7 MG/DL (ref 8.5–10.1)
CANNABINOIDS UR QL SCN: NEGATIVE
CHLORIDE SERPL-SCNC: 108 MMOL/L (ref 97–108)
CO2 SERPL-SCNC: 24 MMOL/L (ref 21–32)
COCAINE UR QL SCN: NEGATIVE
COHGB MFR BLD: 0.3 % (ref 1–2)
CREAT SERPL-MCNC: 1.33 MG/DL (ref 0.7–1.3)
DIFFERENTIAL METHOD BLD: ABNORMAL
EOSINOPHIL # BLD: 0 K/UL (ref 0–0.4)
EOSINOPHIL NFR BLD: 0 % (ref 0–7)
ERYTHROCYTE [DISTWIDTH] IN BLOOD BY AUTOMATED COUNT: 13.4 % (ref 11.5–14.5)
EST. AVERAGE GLUCOSE BLD GHB EST-MCNC: 120 MG/DL
FIO2 ON VENT: 40 %
GAS FLOW.O2 SETTING OXYMISER: 16
GLOBULIN SER CALC-MCNC: 3.6 G/DL (ref 2–4)
GLUCOSE BLD STRIP.AUTO-MCNC: 100 MG/DL (ref 65–100)
GLUCOSE BLD STRIP.AUTO-MCNC: 117 MG/DL (ref 65–100)
GLUCOSE BLD STRIP.AUTO-MCNC: 128 MG/DL (ref 65–100)
GLUCOSE BLD STRIP.AUTO-MCNC: 128 MG/DL (ref 65–100)
GLUCOSE SERPL-MCNC: 141 MG/DL (ref 65–100)
HBA1C MFR BLD: 5.8 % (ref 4–5.6)
HCO3 BLDA-SCNC: 21 MMOL/L (ref 22–26)
HCT VFR BLD AUTO: 46.6 % (ref 36.6–50.3)
HGB BLD-MCNC: 15.3 G/DL (ref 12.1–17)
IMM GRANULOCYTES # BLD AUTO: 0.1 K/UL (ref 0–0.04)
IMM GRANULOCYTES NFR BLD AUTO: 0 % (ref 0–0.5)
LYMPHOCYTES # BLD: 1.6 K/UL (ref 0.8–3.5)
LYMPHOCYTES NFR BLD: 11 % (ref 12–49)
Lab: NORMAL
MCH RBC QN AUTO: 28.5 PG (ref 26–34)
MCHC RBC AUTO-ENTMCNC: 32.8 G/DL (ref 30–36.5)
MCV RBC AUTO: 86.8 FL (ref 80–99)
METHADONE UR QL: NEGATIVE
METHGB MFR BLD: 0.3 % (ref 0–1.4)
MONOCYTES # BLD: 1.2 K/UL (ref 0–1)
MONOCYTES NFR BLD: 8 % (ref 5–13)
NEUTS SEG # BLD: 12.2 K/UL (ref 1.8–8)
NEUTS SEG NFR BLD: 81 % (ref 32–75)
NRBC # BLD: 0 K/UL (ref 0–0.01)
NRBC BLD-RTO: 0 PER 100 WBC
OPIATES UR QL: NEGATIVE
OXYHGB MFR BLD: 97.4 % (ref 95–99)
PCO2 BLDA: 35 MMHG (ref 35–45)
PCP UR QL: NEGATIVE
PEEP RESPIRATORY: 5
PERFORMED BY:: ABNORMAL
PERFORMED BY:: NORMAL
PH BLDA: 7.4 (ref 7.35–7.45)
PLATELET # BLD AUTO: 381 K/UL (ref 150–400)
PMV BLD AUTO: 9.6 FL (ref 8.9–12.9)
PO2 BLDA: 112 MMHG (ref 80–100)
POTASSIUM SERPL-SCNC: 4.4 MMOL/L (ref 3.5–5.1)
PROT SERPL-MCNC: 6.7 G/DL (ref 6.4–8.2)
RBC # BLD AUTO: 5.37 M/UL (ref 4.1–5.7)
SAO2 % BLD: 98 % (ref 95–99)
SAO2% DEVICE SAO2% SENSOR NAME: ABNORMAL
SODIUM SERPL-SCNC: 139 MMOL/L (ref 136–145)
SPECIMEN SITE: ABNORMAL
TROPONIN I SERPL HS-MCNC: 5360 NG/L (ref 0–76)
VENTILATION MODE VENT: ABNORMAL
VT SETTING VENT: 400
WBC # BLD AUTO: 15.1 K/UL (ref 4.1–11.1)

## 2024-08-05 PROCEDURE — 2580000003 HC RX 258: Performed by: INTERNAL MEDICINE

## 2024-08-05 PROCEDURE — 94003 VENT MGMT INPAT SUBQ DAY: CPT

## 2024-08-05 PROCEDURE — 2500000003 HC RX 250 WO HCPCS: Performed by: INTERNAL MEDICINE

## 2024-08-05 PROCEDURE — 6370000000 HC RX 637 (ALT 250 FOR IP): Performed by: INTERNAL MEDICINE

## 2024-08-05 PROCEDURE — 83880 ASSAY OF NATRIURETIC PEPTIDE: CPT

## 2024-08-05 PROCEDURE — 82962 GLUCOSE BLOOD TEST: CPT

## 2024-08-05 PROCEDURE — 93005 ELECTROCARDIOGRAM TRACING: CPT | Performed by: INTERNAL MEDICINE

## 2024-08-05 PROCEDURE — 85025 COMPLETE CBC W/AUTO DIFF WBC: CPT

## 2024-08-05 PROCEDURE — 71045 X-RAY EXAM CHEST 1 VIEW: CPT

## 2024-08-05 PROCEDURE — 6360000002 HC RX W HCPCS: Performed by: INTERNAL MEDICINE

## 2024-08-05 PROCEDURE — 2000000000 HC ICU R&B

## 2024-08-05 PROCEDURE — 82803 BLOOD GASES ANY COMBINATION: CPT

## 2024-08-05 PROCEDURE — 80053 COMPREHEN METABOLIC PANEL: CPT

## 2024-08-05 PROCEDURE — 76700 US EXAM ABDOM COMPLETE: CPT

## 2024-08-05 PROCEDURE — 84484 ASSAY OF TROPONIN QUANT: CPT

## 2024-08-05 PROCEDURE — 2700000000 HC OXYGEN THERAPY PER DAY

## 2024-08-05 PROCEDURE — 80307 DRUG TEST PRSMV CHEM ANLYZR: CPT

## 2024-08-05 PROCEDURE — 36600 WITHDRAWAL OF ARTERIAL BLOOD: CPT

## 2024-08-05 PROCEDURE — 94761 N-INVAS EAR/PLS OXIMETRY MLT: CPT

## 2024-08-05 PROCEDURE — 36415 COLL VENOUS BLD VENIPUNCTURE: CPT

## 2024-08-05 RX ORDER — THIAMINE HYDROCHLORIDE 100 MG/ML
100 INJECTION, SOLUTION INTRAMUSCULAR; INTRAVENOUS DAILY
Status: DISCONTINUED | OUTPATIENT
Start: 2024-08-05 | End: 2024-08-14 | Stop reason: HOSPADM

## 2024-08-05 RX ORDER — DEXMEDETOMIDINE HYDROCHLORIDE 4 UG/ML
.1-1.5 INJECTION, SOLUTION INTRAVENOUS CONTINUOUS
Status: DISCONTINUED | OUTPATIENT
Start: 2024-08-05 | End: 2024-08-07

## 2024-08-05 RX ADMIN — FENTANYL CITRATE 50 MCG: 50 INJECTION, SOLUTION INTRAMUSCULAR; INTRAVENOUS at 02:48

## 2024-08-05 RX ADMIN — PROPOFOL 40 MCG/KG/MIN: 10 INJECTION, EMULSION INTRAVENOUS at 06:43

## 2024-08-05 RX ADMIN — SODIUM CHLORIDE, PRESERVATIVE FREE 10 ML: 5 INJECTION INTRAVENOUS at 20:03

## 2024-08-05 RX ADMIN — CARVEDILOL 6.25 MG: 3.12 TABLET, FILM COATED ORAL at 15:38

## 2024-08-05 RX ADMIN — DEXMEDETOMIDINE HYDROCHLORIDE 0.2 MCG/KG/HR: 400 INJECTION, SOLUTION INTRAVENOUS at 13:34

## 2024-08-05 RX ADMIN — SACUBITRIL AND VALSARTAN 1 TABLET: 24; 26 TABLET, FILM COATED ORAL at 15:38

## 2024-08-05 RX ADMIN — SODIUM CHLORIDE, PRESERVATIVE FREE 10 ML: 5 INJECTION INTRAVENOUS at 10:09

## 2024-08-05 RX ADMIN — FENTANYL CITRATE 50 MCG: 50 INJECTION, SOLUTION INTRAMUSCULAR; INTRAVENOUS at 06:57

## 2024-08-05 RX ADMIN — SPIRONOLACTONE 25 MG: 25 TABLET ORAL at 15:38

## 2024-08-05 RX ADMIN — ATORVASTATIN CALCIUM 40 MG: 40 TABLET, FILM COATED ORAL at 20:00

## 2024-08-05 RX ADMIN — PROPOFOL 35 MCG/KG/MIN: 10 INJECTION, EMULSION INTRAVENOUS at 01:23

## 2024-08-05 RX ADMIN — ACETAMINOPHEN 650 MG: 325 TABLET ORAL at 20:00

## 2024-08-05 RX ADMIN — THIAMINE HYDROCHLORIDE 100 MG: 100 INJECTION, SOLUTION INTRAMUSCULAR; INTRAVENOUS at 15:18

## 2024-08-05 RX ADMIN — SACUBITRIL AND VALSARTAN 1 TABLET: 24; 26 TABLET, FILM COATED ORAL at 20:00

## 2024-08-05 RX ADMIN — PROPOFOL 50 MCG/KG/MIN: 10 INJECTION, EMULSION INTRAVENOUS at 10:32

## 2024-08-05 RX ADMIN — AMIODARONE HYDROCHLORIDE 0.5 MG/MIN: 1.8 INJECTION, SOLUTION INTRAVENOUS at 06:43

## 2024-08-05 RX ADMIN — ASPIRIN 81 MG CHEWABLE TABLET 81 MG: 81 TABLET CHEWABLE at 10:09

## 2024-08-05 RX ADMIN — FENTANYL CITRATE 50 MCG: 50 INJECTION, SOLUTION INTRAMUSCULAR; INTRAVENOUS at 10:23

## 2024-08-05 RX ADMIN — AMIODARONE HYDROCHLORIDE 0.5 MG/MIN: 1.8 INJECTION, SOLUTION INTRAVENOUS at 19:00

## 2024-08-05 ASSESSMENT — PULMONARY FUNCTION TESTS
PIF_VALUE: 16
PIF_VALUE: 17
PIF_VALUE: 18
PIF_VALUE: 16
PIF_VALUE: 17
PIF_VALUE: 16
PIF_VALUE: 18
PIF_VALUE: 16
PIF_VALUE: 16
PIF_VALUE: 15

## 2024-08-05 ASSESSMENT — PAIN SCALES - GENERAL
PAINLEVEL_OUTOF10: 2
PAINLEVEL_OUTOF10: 0
PAINLEVEL_OUTOF10: 0
PAINLEVEL_OUTOF10: 3
PAINLEVEL_OUTOF10: 0
PAINLEVEL_OUTOF10: 0
PAINLEVEL_OUTOF10: 6

## 2024-08-05 ASSESSMENT — PAIN SCALES - WONG BAKER: WONGBAKER_NUMERICALRESPONSE: NO HURT

## 2024-08-05 ASSESSMENT — PAIN DESCRIPTION - PAIN TYPE: TYPE: OTHER (COMMENT)

## 2024-08-05 NOTE — CARE COORDINATION
Remains vented and sedated.  Amio drip.  Wean as appropriate.  Cardiology consult.  EP consult TBD.      When medically stable patient will need PT/OT eval for therapy recommendation.  Discharge disposition TBD.        LALIT Aguirre

## 2024-08-06 ENCOUNTER — APPOINTMENT (OUTPATIENT)
Facility: HOSPITAL | Age: 54
DRG: 275 | End: 2024-08-06
Payer: COMMERCIAL

## 2024-08-06 LAB
ANION GAP SERPL CALC-SCNC: 3 MMOL/L (ref 5–15)
BNP SERPL-MCNC: 918 PG/ML
BUN SERPL-MCNC: 18 MG/DL (ref 6–20)
BUN/CREAT SERPL: 18 (ref 12–20)
CA-I BLD-MCNC: 8.6 MG/DL (ref 8.5–10.1)
CHLORIDE SERPL-SCNC: 109 MMOL/L (ref 97–108)
CO2 SERPL-SCNC: 27 MMOL/L (ref 21–32)
CREAT SERPL-MCNC: 0.99 MG/DL (ref 0.7–1.3)
D DIMER PPP FEU-MCNC: 6.03 UG/ML(FEU)
EKG ATRIAL RATE: 77 BPM
EKG ATRIAL RATE: 78 BPM
EKG DIAGNOSIS: NORMAL
EKG DIAGNOSIS: NORMAL
EKG P AXIS: 23 DEGREES
EKG P AXIS: 33 DEGREES
EKG P-R INTERVAL: 186 MS
EKG P-R INTERVAL: 188 MS
EKG Q-T INTERVAL: 422 MS
EKG Q-T INTERVAL: 520 MS
EKG QRS DURATION: 104 MS
EKG QRS DURATION: 110 MS
EKG QTC CALCULATION (BAZETT): 481 MS
EKG QTC CALCULATION (BAZETT): 588 MS
EKG R AXIS: -40 DEGREES
EKG R AXIS: -41 DEGREES
EKG T AXIS: 150 DEGREES
EKG T AXIS: 218 DEGREES
EKG VENTRICULAR RATE: 77 BPM
EKG VENTRICULAR RATE: 78 BPM
GLUCOSE BLD STRIP.AUTO-MCNC: 102 MG/DL (ref 65–100)
GLUCOSE BLD STRIP.AUTO-MCNC: 106 MG/DL (ref 65–100)
GLUCOSE BLD STRIP.AUTO-MCNC: 119 MG/DL (ref 65–100)
GLUCOSE BLD STRIP.AUTO-MCNC: 145 MG/DL (ref 65–100)
GLUCOSE SERPL-MCNC: 116 MG/DL (ref 65–100)
MAGNESIUM SERPL-MCNC: 2.3 MG/DL (ref 1.6–2.4)
PERFORMED BY:: ABNORMAL
POTASSIUM SERPL-SCNC: 4 MMOL/L (ref 3.5–5.1)
SODIUM SERPL-SCNC: 139 MMOL/L (ref 136–145)
TROPONIN I SERPL HS-MCNC: 3895 NG/L (ref 0–76)

## 2024-08-06 PROCEDURE — 2000000000 HC ICU R&B

## 2024-08-06 PROCEDURE — 80048 BASIC METABOLIC PNL TOTAL CA: CPT

## 2024-08-06 PROCEDURE — 6370000000 HC RX 637 (ALT 250 FOR IP): Performed by: INTERNAL MEDICINE

## 2024-08-06 PROCEDURE — 71275 CT ANGIOGRAPHY CHEST: CPT

## 2024-08-06 PROCEDURE — 93005 ELECTROCARDIOGRAM TRACING: CPT | Performed by: INTERNAL MEDICINE

## 2024-08-06 PROCEDURE — 71045 X-RAY EXAM CHEST 1 VIEW: CPT

## 2024-08-06 PROCEDURE — 83735 ASSAY OF MAGNESIUM: CPT

## 2024-08-06 PROCEDURE — 82962 GLUCOSE BLOOD TEST: CPT

## 2024-08-06 PROCEDURE — 36415 COLL VENOUS BLD VENIPUNCTURE: CPT

## 2024-08-06 PROCEDURE — 6360000002 HC RX W HCPCS: Performed by: INTERNAL MEDICINE

## 2024-08-06 PROCEDURE — 84484 ASSAY OF TROPONIN QUANT: CPT

## 2024-08-06 PROCEDURE — 85379 FIBRIN DEGRADATION QUANT: CPT

## 2024-08-06 PROCEDURE — 6360000004 HC RX CONTRAST MEDICATION: Performed by: INTERNAL MEDICINE

## 2024-08-06 PROCEDURE — 2500000003 HC RX 250 WO HCPCS: Performed by: INTERNAL MEDICINE

## 2024-08-06 PROCEDURE — 2580000003 HC RX 258: Performed by: INTERNAL MEDICINE

## 2024-08-06 PROCEDURE — 83880 ASSAY OF NATRIURETIC PEPTIDE: CPT

## 2024-08-06 RX ORDER — FAMOTIDINE 20 MG/1
20 TABLET, FILM COATED ORAL 2 TIMES DAILY
Status: DISCONTINUED | OUTPATIENT
Start: 2024-08-06 | End: 2024-08-14 | Stop reason: HOSPADM

## 2024-08-06 RX ORDER — LIDOCAINE 50 MG/G
OINTMENT TOPICAL PRN
Status: DISCONTINUED | OUTPATIENT
Start: 2024-08-06 | End: 2024-08-14 | Stop reason: HOSPADM

## 2024-08-06 RX ORDER — HEPARIN SODIUM 5000 [USP'U]/ML
5000 INJECTION, SOLUTION INTRAVENOUS; SUBCUTANEOUS EVERY 8 HOURS SCHEDULED
Status: DISCONTINUED | OUTPATIENT
Start: 2024-08-06 | End: 2024-08-07

## 2024-08-06 RX ADMIN — SPIRONOLACTONE 25 MG: 25 TABLET ORAL at 09:41

## 2024-08-06 RX ADMIN — SACUBITRIL AND VALSARTAN 1 TABLET: 49; 51 TABLET, FILM COATED ORAL at 09:41

## 2024-08-06 RX ADMIN — FAMOTIDINE 20 MG: 20 TABLET, FILM COATED ORAL at 12:13

## 2024-08-06 RX ADMIN — SODIUM CHLORIDE, PRESERVATIVE FREE 10 ML: 5 INJECTION INTRAVENOUS at 19:53

## 2024-08-06 RX ADMIN — ACETAMINOPHEN 650 MG: 325 TABLET ORAL at 12:13

## 2024-08-06 RX ADMIN — FENTANYL CITRATE 50 MCG: 50 INJECTION, SOLUTION INTRAMUSCULAR; INTRAVENOUS at 22:50

## 2024-08-06 RX ADMIN — ASPIRIN 81 MG CHEWABLE TABLET 81 MG: 81 TABLET CHEWABLE at 09:41

## 2024-08-06 RX ADMIN — SACUBITRIL AND VALSARTAN 1 TABLET: 49; 51 TABLET, FILM COATED ORAL at 19:45

## 2024-08-06 RX ADMIN — CARVEDILOL 6.25 MG: 3.12 TABLET, FILM COATED ORAL at 16:32

## 2024-08-06 RX ADMIN — FENTANYL CITRATE 50 MCG: 50 INJECTION, SOLUTION INTRAMUSCULAR; INTRAVENOUS at 19:46

## 2024-08-06 RX ADMIN — ATORVASTATIN CALCIUM 40 MG: 40 TABLET, FILM COATED ORAL at 19:46

## 2024-08-06 RX ADMIN — FAMOTIDINE 20 MG: 20 TABLET, FILM COATED ORAL at 19:46

## 2024-08-06 RX ADMIN — AMIODARONE HYDROCHLORIDE 0.5 MG/MIN: 1.8 INJECTION, SOLUTION INTRAVENOUS at 22:30

## 2024-08-06 RX ADMIN — CARVEDILOL 6.25 MG: 3.12 TABLET, FILM COATED ORAL at 06:54

## 2024-08-06 RX ADMIN — THIAMINE HYDROCHLORIDE 100 MG: 100 INJECTION, SOLUTION INTRAMUSCULAR; INTRAVENOUS at 09:41

## 2024-08-06 RX ADMIN — HEPARIN SODIUM 5000 UNITS: 5000 INJECTION INTRAVENOUS; SUBCUTANEOUS at 16:32

## 2024-08-06 RX ADMIN — FENTANYL CITRATE 50 MCG: 50 INJECTION, SOLUTION INTRAMUSCULAR; INTRAVENOUS at 15:35

## 2024-08-06 RX ADMIN — FENTANYL CITRATE 50 MCG: 50 INJECTION, SOLUTION INTRAMUSCULAR; INTRAVENOUS at 18:14

## 2024-08-06 RX ADMIN — ACETAMINOPHEN 650 MG: 325 TABLET ORAL at 02:22

## 2024-08-06 RX ADMIN — AMIODARONE HYDROCHLORIDE 0.5 MG/MIN: 1.8 INJECTION, SOLUTION INTRAVENOUS at 11:55

## 2024-08-06 RX ADMIN — HEPARIN SODIUM 5000 UNITS: 5000 INJECTION INTRAVENOUS; SUBCUTANEOUS at 22:30

## 2024-08-06 RX ADMIN — SODIUM CHLORIDE, PRESERVATIVE FREE 10 ML: 5 INJECTION INTRAVENOUS at 09:45

## 2024-08-06 RX ADMIN — AMIODARONE HYDROCHLORIDE 0.5 MG/MIN: 1.8 INJECTION, SOLUTION INTRAVENOUS at 02:22

## 2024-08-06 RX ADMIN — FENTANYL CITRATE 50 MCG: 50 INJECTION, SOLUTION INTRAMUSCULAR; INTRAVENOUS at 09:41

## 2024-08-06 RX ADMIN — IOPAMIDOL 100 ML: 755 INJECTION, SOLUTION INTRAVENOUS at 17:12

## 2024-08-06 ASSESSMENT — PAIN SCALES - GENERAL
PAINLEVEL_OUTOF10: 5
PAINLEVEL_OUTOF10: 0
PAINLEVEL_OUTOF10: 5
PAINLEVEL_OUTOF10: 8
PAINLEVEL_OUTOF10: 6
PAINLEVEL_OUTOF10: 7
PAINLEVEL_OUTOF10: 4
PAINLEVEL_OUTOF10: 7
PAINLEVEL_OUTOF10: 7
PAINLEVEL_OUTOF10: 6

## 2024-08-06 ASSESSMENT — PAIN DESCRIPTION - ORIENTATION
ORIENTATION: MID
ORIENTATION: MID
ORIENTATION: MID;LOWER
ORIENTATION: MID

## 2024-08-06 ASSESSMENT — PAIN DESCRIPTION - LOCATION
LOCATION: STERNUM

## 2024-08-06 ASSESSMENT — PAIN DESCRIPTION - DESCRIPTORS
DESCRIPTORS: SHARP

## 2024-08-06 NOTE — DISCHARGE INSTRUCTIONS
Cardiac rehab is a very important way to help you  to feel better and stronger more quickly  and reduce the risks of heart problems in the future.     Cardiac rehabilitation services are provided at:  [facility]  [address] [place] [address], [address]  [place], [place] [address] [phone]    There are also many other locations that provide this service.   You can be referred to any location nearest to  where you live or work to best accommodate your needs    Someone will contact you to schedule an initial assessment to begin cardiac rehabilitation.

## 2024-08-06 NOTE — CARE COORDINATION
CM reviewed Pt medicals, Pt lives with her significant other and two daughters.  Pt was IND at baseline.     CM will follow for D/C needs, will need PT/OT eval/recommendations.

## 2024-08-07 ENCOUNTER — APPOINTMENT (OUTPATIENT)
Facility: HOSPITAL | Age: 54
DRG: 275 | End: 2024-08-07
Payer: COMMERCIAL

## 2024-08-07 ENCOUNTER — ANESTHESIA EVENT (OUTPATIENT)
Facility: HOSPITAL | Age: 54
DRG: 275 | End: 2024-08-07
Payer: COMMERCIAL

## 2024-08-07 ENCOUNTER — ANESTHESIA (OUTPATIENT)
Facility: HOSPITAL | Age: 54
DRG: 275 | End: 2024-08-07
Payer: COMMERCIAL

## 2024-08-07 LAB
ANION GAP SERPL CALC-SCNC: 6 MMOL/L (ref 5–15)
BASOPHILS # BLD: 0.1 K/UL (ref 0–0.1)
BASOPHILS NFR BLD: 1 % (ref 0–1)
BUN SERPL-MCNC: 11 MG/DL (ref 6–20)
BUN/CREAT SERPL: 17 (ref 12–20)
CA-I BLD-MCNC: 8.7 MG/DL (ref 8.5–10.1)
CHLORIDE SERPL-SCNC: 109 MMOL/L (ref 97–108)
CO2 SERPL-SCNC: 25 MMOL/L (ref 21–32)
CREAT SERPL-MCNC: 0.66 MG/DL (ref 0.7–1.3)
DIFFERENTIAL METHOD BLD: ABNORMAL
ECHO BSA: 1.92 M2
EOSINOPHIL # BLD: 0.2 K/UL (ref 0–0.4)
EOSINOPHIL NFR BLD: 2 % (ref 0–7)
ERYTHROCYTE [DISTWIDTH] IN BLOOD BY AUTOMATED COUNT: 13.6 % (ref 11.5–14.5)
GLUCOSE BLD STRIP.AUTO-MCNC: 114 MG/DL (ref 65–100)
GLUCOSE BLD STRIP.AUTO-MCNC: 118 MG/DL (ref 65–100)
GLUCOSE BLD STRIP.AUTO-MCNC: 190 MG/DL (ref 65–100)
GLUCOSE SERPL-MCNC: 128 MG/DL (ref 65–100)
HCT VFR BLD AUTO: 45.6 % (ref 36.6–50.3)
HGB BLD-MCNC: 15 G/DL (ref 12.1–17)
IMM GRANULOCYTES # BLD AUTO: 0.1 K/UL (ref 0–0.04)
IMM GRANULOCYTES NFR BLD AUTO: 1 % (ref 0–0.5)
LYMPHOCYTES # BLD: 2.4 K/UL (ref 0.8–3.5)
LYMPHOCYTES NFR BLD: 19 % (ref 12–49)
MAGNESIUM SERPL-MCNC: 2 MG/DL (ref 1.6–2.4)
MCH RBC QN AUTO: 28.5 PG (ref 26–34)
MCHC RBC AUTO-ENTMCNC: 32.9 G/DL (ref 30–36.5)
MCV RBC AUTO: 86.7 FL (ref 80–99)
MONOCYTES # BLD: 1 K/UL (ref 0–1)
MONOCYTES NFR BLD: 8 % (ref 5–13)
NEUTS SEG # BLD: 8.7 K/UL (ref 1.8–8)
NEUTS SEG NFR BLD: 69 % (ref 32–75)
NRBC # BLD: 0 K/UL (ref 0–0.01)
NRBC BLD-RTO: 0 PER 100 WBC
PERFORMED BY:: ABNORMAL
PLATELET # BLD AUTO: 295 K/UL (ref 150–400)
PMV BLD AUTO: 9.5 FL (ref 8.9–12.9)
POTASSIUM SERPL-SCNC: 3.8 MMOL/L (ref 3.5–5.1)
RBC # BLD AUTO: 5.26 M/UL (ref 4.1–5.7)
SODIUM SERPL-SCNC: 140 MMOL/L (ref 136–145)
WBC # BLD AUTO: 12.5 K/UL (ref 4.1–11.1)

## 2024-08-07 PROCEDURE — 02HK3KZ INSERTION OF DEFIBRILLATOR LEAD INTO RIGHT VENTRICLE, PERCUTANEOUS APPROACH: ICD-10-PCS | Performed by: INTERNAL MEDICINE

## 2024-08-07 PROCEDURE — 3700000001 HC ADD 15 MINUTES (ANESTHESIA): Performed by: INTERNAL MEDICINE

## 2024-08-07 PROCEDURE — 93005 ELECTROCARDIOGRAM TRACING: CPT | Performed by: INTERNAL MEDICINE

## 2024-08-07 PROCEDURE — 6370000000 HC RX 637 (ALT 250 FOR IP): Performed by: INTERNAL MEDICINE

## 2024-08-07 PROCEDURE — 71046 X-RAY EXAM CHEST 2 VIEWS: CPT

## 2024-08-07 PROCEDURE — 6360000004 HC RX CONTRAST MEDICATION: Performed by: INTERNAL MEDICINE

## 2024-08-07 PROCEDURE — 33249 INSJ/RPLCMT DEFIB W/LEAD(S): CPT | Performed by: INTERNAL MEDICINE

## 2024-08-07 PROCEDURE — 6360000002 HC RX W HCPCS: Performed by: INTERNAL MEDICINE

## 2024-08-07 PROCEDURE — 6360000002 HC RX W HCPCS: Performed by: NURSE ANESTHETIST, CERTIFIED REGISTERED

## 2024-08-07 PROCEDURE — 82962 GLUCOSE BLOOD TEST: CPT

## 2024-08-07 PROCEDURE — 2580000003 HC RX 258: Performed by: INTERNAL MEDICINE

## 2024-08-07 PROCEDURE — 71045 X-RAY EXAM CHEST 1 VIEW: CPT

## 2024-08-07 PROCEDURE — 2500000003 HC RX 250 WO HCPCS: Performed by: INTERNAL MEDICINE

## 2024-08-07 PROCEDURE — 75820 VEIN X-RAY ARM/LEG: CPT | Performed by: INTERNAL MEDICINE

## 2024-08-07 PROCEDURE — 0JH608Z INSERTION OF DEFIBRILLATOR GENERATOR INTO CHEST SUBCUTANEOUS TISSUE AND FASCIA, OPEN APPROACH: ICD-10-PCS | Performed by: INTERNAL MEDICINE

## 2024-08-07 PROCEDURE — C1898 LEAD, PMKR, OTHER THAN TRANS: HCPCS | Performed by: INTERNAL MEDICINE

## 2024-08-07 PROCEDURE — 2000000000 HC ICU R&B

## 2024-08-07 PROCEDURE — C1892 INTRO/SHEATH,FIXED,PEEL-AWAY: HCPCS | Performed by: INTERNAL MEDICINE

## 2024-08-07 PROCEDURE — 83735 ASSAY OF MAGNESIUM: CPT

## 2024-08-07 PROCEDURE — 80048 BASIC METABOLIC PNL TOTAL CA: CPT

## 2024-08-07 PROCEDURE — C1777 LEAD, AICD, ENDO SINGLE COIL: HCPCS | Performed by: INTERNAL MEDICINE

## 2024-08-07 PROCEDURE — 3700000000 HC ANESTHESIA ATTENDED CARE: Performed by: INTERNAL MEDICINE

## 2024-08-07 PROCEDURE — 85025 COMPLETE CBC W/AUTO DIFF WBC: CPT

## 2024-08-07 PROCEDURE — 36415 COLL VENOUS BLD VENIPUNCTURE: CPT

## 2024-08-07 PROCEDURE — 02H63KZ INSERTION OF DEFIBRILLATOR LEAD INTO RIGHT ATRIUM, PERCUTANEOUS APPROACH: ICD-10-PCS | Performed by: INTERNAL MEDICINE

## 2024-08-07 PROCEDURE — 2580000003 HC RX 258: Performed by: NURSE ANESTHETIST, CERTIFIED REGISTERED

## 2024-08-07 PROCEDURE — 2709999900 HC NON-CHARGEABLE SUPPLY: Performed by: INTERNAL MEDICINE

## 2024-08-07 PROCEDURE — C1721 AICD, DUAL CHAMBER: HCPCS | Performed by: INTERNAL MEDICINE

## 2024-08-07 DEVICE — LEAD 6935M55 QUATTRO SECURE S MRI US
Type: IMPLANTABLE DEVICE | Status: FUNCTIONAL
Brand: SPRINT QUATTRO SECURE S MRI™ SURESCAN™

## 2024-08-07 DEVICE — LEAD 5076-52 MRI US RCMCRD
Type: IMPLANTABLE DEVICE | Status: FUNCTIONAL
Brand: CAPSUREFIX NOVUS MRI™ SURESCAN®

## 2024-08-07 DEVICE — ICD DDPA2D4 COBALT XT DR MRI DF4 USA
Type: IMPLANTABLE DEVICE | Status: FUNCTIONAL
Brand: COBALT™ XT DR MRI SURESCAN™

## 2024-08-07 RX ORDER — LIDOCAINE HYDROCHLORIDE 20 MG/ML
INJECTION, SOLUTION EPIDURAL; INFILTRATION; INTRACAUDAL; PERINEURAL PRN
Status: DISCONTINUED | OUTPATIENT
Start: 2024-08-07 | End: 2024-08-07 | Stop reason: SDUPTHER

## 2024-08-07 RX ORDER — AMIODARONE HYDROCHLORIDE 200 MG/1
400 TABLET ORAL 2 TIMES DAILY
Status: COMPLETED | OUTPATIENT
Start: 2024-08-07 | End: 2024-08-10

## 2024-08-07 RX ORDER — FUROSEMIDE 10 MG/ML
20 INJECTION INTRAMUSCULAR; INTRAVENOUS ONCE
Status: COMPLETED | OUTPATIENT
Start: 2024-08-07 | End: 2024-08-07

## 2024-08-07 RX ORDER — CEFAZOLIN SODIUM 1 G/3ML
INJECTION, POWDER, FOR SOLUTION INTRAMUSCULAR; INTRAVENOUS PRN
Status: DISCONTINUED | OUTPATIENT
Start: 2024-08-07 | End: 2024-08-07 | Stop reason: SDUPTHER

## 2024-08-07 RX ORDER — PROPOFOL 10 MG/ML
INJECTION, EMULSION INTRAVENOUS PRN
Status: DISCONTINUED | OUTPATIENT
Start: 2024-08-07 | End: 2024-08-07 | Stop reason: SDUPTHER

## 2024-08-07 RX ORDER — LIDOCAINE HYDROCHLORIDE AND EPINEPHRINE BITARTRATE 20; .01 MG/ML; MG/ML
INJECTION, SOLUTION SUBCUTANEOUS PRN
Status: DISCONTINUED | OUTPATIENT
Start: 2024-08-07 | End: 2024-08-07 | Stop reason: HOSPADM

## 2024-08-07 RX ORDER — FENTANYL CITRATE 50 UG/ML
INJECTION, SOLUTION INTRAMUSCULAR; INTRAVENOUS PRN
Status: DISCONTINUED | OUTPATIENT
Start: 2024-08-07 | End: 2024-08-07 | Stop reason: SDUPTHER

## 2024-08-07 RX ORDER — SODIUM CHLORIDE 9 MG/ML
INJECTION, SOLUTION INTRAVENOUS CONTINUOUS PRN
Status: DISCONTINUED | OUTPATIENT
Start: 2024-08-07 | End: 2024-08-07 | Stop reason: SDUPTHER

## 2024-08-07 RX ORDER — CARVEDILOL 12.5 MG/1
12.5 TABLET ORAL 2 TIMES DAILY WITH MEALS
Status: DISCONTINUED | OUTPATIENT
Start: 2024-08-07 | End: 2024-08-14 | Stop reason: HOSPADM

## 2024-08-07 RX ORDER — AMIODARONE HYDROCHLORIDE 200 MG/1
200 TABLET ORAL DAILY
Status: DISCONTINUED | OUTPATIENT
Start: 2024-08-11 | End: 2024-08-14 | Stop reason: HOSPADM

## 2024-08-07 RX ORDER — CARVEDILOL 12.5 MG/1
12.5 TABLET ORAL 2 TIMES DAILY WITH MEALS
Status: DISCONTINUED | OUTPATIENT
Start: 2024-08-07 | End: 2024-08-07

## 2024-08-07 RX ADMIN — SACUBITRIL AND VALSARTAN 1 TABLET: 49; 51 TABLET, FILM COATED ORAL at 09:33

## 2024-08-07 RX ADMIN — SODIUM CHLORIDE, PRESERVATIVE FREE 10 ML: 5 INJECTION INTRAVENOUS at 09:35

## 2024-08-07 RX ADMIN — CARVEDILOL 12.5 MG: 12.5 TABLET, FILM COATED ORAL at 09:41

## 2024-08-07 RX ADMIN — ATORVASTATIN CALCIUM 40 MG: 40 TABLET, FILM COATED ORAL at 21:47

## 2024-08-07 RX ADMIN — FUROSEMIDE 20 MG: 10 INJECTION, SOLUTION INTRAMUSCULAR; INTRAVENOUS at 12:08

## 2024-08-07 RX ADMIN — ACETAMINOPHEN 650 MG: 325 TABLET ORAL at 18:25

## 2024-08-07 RX ADMIN — HEPARIN SODIUM 5000 UNITS: 5000 INJECTION INTRAVENOUS; SUBCUTANEOUS at 05:42

## 2024-08-07 RX ADMIN — PROPOFOL 30 MCG/KG/MIN: 10 INJECTION, EMULSION INTRAVENOUS at 14:16

## 2024-08-07 RX ADMIN — THIAMINE HYDROCHLORIDE 100 MG: 100 INJECTION, SOLUTION INTRAMUSCULAR; INTRAVENOUS at 09:34

## 2024-08-07 RX ADMIN — FENTANYL CITRATE 50 MCG: 50 INJECTION, SOLUTION INTRAMUSCULAR; INTRAVENOUS at 12:08

## 2024-08-07 RX ADMIN — SODIUM CHLORIDE: 9 INJECTION, SOLUTION INTRAVENOUS at 14:00

## 2024-08-07 RX ADMIN — FENTANYL CITRATE 50 MCG: 50 INJECTION, SOLUTION INTRAMUSCULAR; INTRAVENOUS at 14:32

## 2024-08-07 RX ADMIN — FENTANYL CITRATE 50 MCG: 50 INJECTION, SOLUTION INTRAMUSCULAR; INTRAVENOUS at 05:42

## 2024-08-07 RX ADMIN — AMIODARONE HYDROCHLORIDE 400 MG: 200 TABLET ORAL at 21:47

## 2024-08-07 RX ADMIN — LIDOCAINE HYDROCHLORIDE 60 MG: 20 INJECTION, SOLUTION EPIDURAL; INFILTRATION; INTRACAUDAL; PERINEURAL at 14:16

## 2024-08-07 RX ADMIN — FAMOTIDINE 20 MG: 20 TABLET, FILM COATED ORAL at 09:33

## 2024-08-07 RX ADMIN — FENTANYL CITRATE 25 MCG: 50 INJECTION, SOLUTION INTRAMUSCULAR; INTRAVENOUS at 09:33

## 2024-08-07 RX ADMIN — CEFAZOLIN SODIUM 2 G: 1 INJECTION, POWDER, FOR SOLUTION INTRAMUSCULAR; INTRAVENOUS at 14:15

## 2024-08-07 RX ADMIN — SODIUM CHLORIDE, PRESERVATIVE FREE 10 ML: 5 INJECTION INTRAVENOUS at 21:48

## 2024-08-07 RX ADMIN — FENTANYL CITRATE 50 MCG: 50 INJECTION, SOLUTION INTRAMUSCULAR; INTRAVENOUS at 01:15

## 2024-08-07 RX ADMIN — FENTANYL CITRATE 50 MCG: 50 INJECTION, SOLUTION INTRAMUSCULAR; INTRAVENOUS at 21:45

## 2024-08-07 RX ADMIN — PROPOFOL 10 MG: 10 INJECTION, EMULSION INTRAVENOUS at 14:43

## 2024-08-07 RX ADMIN — CARVEDILOL 12.5 MG: 12.5 TABLET, FILM COATED ORAL at 17:41

## 2024-08-07 RX ADMIN — PROPOFOL 40 MG: 10 INJECTION, EMULSION INTRAVENOUS at 14:15

## 2024-08-07 RX ADMIN — FENTANYL CITRATE 50 MCG: 50 INJECTION, SOLUTION INTRAMUSCULAR; INTRAVENOUS at 17:41

## 2024-08-07 RX ADMIN — FENTANYL CITRATE 50 MCG: 50 INJECTION, SOLUTION INTRAMUSCULAR; INTRAVENOUS at 14:15

## 2024-08-07 RX ADMIN — FAMOTIDINE 20 MG: 20 TABLET, FILM COATED ORAL at 21:47

## 2024-08-07 RX ADMIN — AMIODARONE HYDROCHLORIDE 0.5 MG/MIN: 1.8 INJECTION, SOLUTION INTRAVENOUS at 10:37

## 2024-08-07 RX ADMIN — ACETAMINOPHEN 650 MG: 325 TABLET ORAL at 23:57

## 2024-08-07 RX ADMIN — SACUBITRIL AND VALSARTAN 1 TABLET: 49; 51 TABLET, FILM COATED ORAL at 21:47

## 2024-08-07 RX ADMIN — SPIRONOLACTONE 25 MG: 25 TABLET ORAL at 09:33

## 2024-08-07 RX ADMIN — AMIODARONE HYDROCHLORIDE 400 MG: 200 TABLET ORAL at 09:33

## 2024-08-07 ASSESSMENT — PAIN SCALES - GENERAL
PAINLEVEL_OUTOF10: 0
PAINLEVEL_OUTOF10: 7
PAINLEVEL_OUTOF10: 0
PAINLEVEL_OUTOF10: 8
PAINLEVEL_OUTOF10: 2
PAINLEVEL_OUTOF10: 9
PAINLEVEL_OUTOF10: 0
PAINLEVEL_OUTOF10: 3
PAINLEVEL_OUTOF10: 2
PAINLEVEL_OUTOF10: 8
PAINLEVEL_OUTOF10: 3
PAINLEVEL_OUTOF10: 3

## 2024-08-07 ASSESSMENT — PAIN DESCRIPTION - LOCATION
LOCATION: CHEST

## 2024-08-07 ASSESSMENT — PAIN DESCRIPTION - ORIENTATION
ORIENTATION: MID
ORIENTATION: RIGHT
ORIENTATION: RIGHT

## 2024-08-07 NOTE — ANESTHESIA PRE PROCEDURE
Department of Anesthesiology  Preprocedure Note       Name:  CAROLINA CESPEDES   Age:  53 y.o.  :  1970                                          MRN:  [id number]         Date:  2024      Surgeon: Surgeon(s):  Hallie Hinojosa MD    Procedure: Procedure(s):  Insert ICD dual    Medications prior to admission:   Prior to Admission medications    Not on File       Current medications:    Current Facility-Administered Medications   Medication Dose Route Frequency Provider Last Rate Last Admin    amiodarone (CORDARONE) tablet 400 mg  400 mg Oral BID Hallie Hinojosa MD   400 mg at 24 0933    [START ON 2024] amiodarone (CORDARONE) tablet 200 mg  200 mg Oral Daily Hallie Hinojosa MD        carvedilol (COREG) tablet 12.5 mg  12.5 mg Oral BID  Hallie Hinojosa MD   12.5 mg at 24 0941    sacubitril-valsartan (ENTRESTO) 49-51 MG per tablet 1 tablet  1 tablet Oral BID hSa Salas MD   1 tablet at 24 0933    lidocaine (XYLOCAINE) 5 % ointment   Topical PRN Sha Salas MD        famotidine (PEPCID) tablet 20 mg  20 mg Oral BID Isabel Dhillon MD   20 mg at 24 0933    thiamine (B-1) injection 100 mg  100 mg IntraVENous Daily Renee Falcon MD   100 mg at 24 0934    dexmedeTOMIDine (PRECEDEX) 400 mcg in sodium chloride 0.9 % 100 mL infusion  0.1-1.5 mcg/kg/hr IntraVENous Continuous Isabel Dhillon MD   Stopped at 24 2200    amiodarone (NEXTERONE) 360 mg in dextrose 5% 200 ml  0.5 mg/min IntraVENous Continuous Sha Salas MD 16.7 mL/hr at 24 1037 0.5 mg/min at 24 1037    insulin lispro (HUMALOG,ADMELOG) injection vial 0-8 Units  0-8 Units SubCUTAneous TID  Hiro Hannon MD        insulin lispro (HUMALOG,ADMELOG) injection vial 0-4 Units  0-4 Units SubCUTAneous Nightly Hiro Hannon MD        glucose chewable tablet 16 g  4 tablet Oral PRN Hiro Hannon MD        dextrose bolus 10% 125 mL  125 mL IntraVENous PRN Hiro Hannon MD        Or     Value Date/Time    PHART 7.40 08/05/2024 04:21 AM    PO2ART 112 08/05/2024 04:21 AM    GKY9UHZ 35 08/05/2024 04:21 AM    DRU9KLS 21 08/05/2024 04:21 AM    I8LYGFPJ 98 08/05/2024 04:21 AM        Type & Screen (If Applicable):  No results found for: \"LABABO\"    Drug/Infectious Status (If Applicable):  No results found for: \"HIV\", \"HEPCAB\"    COVID-19 Screening (If Applicable):   Lab Results   Component Value Date/Time    COVID19 Not Detected 08/04/2024 04:20 PM           Anesthesia Evaluation  Patient summary reviewed and Nursing notes reviewed   no history of anesthetic complications:   Airway: Mallampati: I  TM distance: >3 FB   Neck ROM: full  Mouth opening: > = 3 FB   Dental: normal exam         Pulmonary:Negative Pulmonary ROS and normal exam  breath sounds clear to auscultation                             Cardiovascular:    (+) hypertension:, valvular problems/murmurs: AS and AI        Rhythm: regular  Rate: normal                 ROS comment: Echo:  ·  Left Ventricle: Severely reduced left ventricular systolic function with a visually estimated EF of 20 - 25%. Left ventricle is moderately dilated. Severely increased wall thickness. Global hypokinesis present. Moderate hypokinesis of the following segments: mid inferoseptal. Diastolic dysfunction present with increased LAP with normal LV EF.  ·  Right Ventricle: Right ventricle is mildly dilated.  ·  Aortic Valve: Mild sclerosis of the aortic valve cusps. Moderate regurgitation with a centrally directed jet. Mild to moderate stenosis of the aortic valve.  ·  Mitral Valve: Moderately thickened leaflets. Mild regurgitation.  ·  Tricuspid Valve: Moderate regurgitation.  ·  Left Atrium: Left atrium is moderately dilated. Left atrial volume index is normal (16-34 mL/m2).  ·  Right Atrium: Right atrium is moderately dilated.  ·  Aorta: Mildly dilated aortic root. Ao root diameter is 3.8 cm. Mildly dilated ascending aorta. Ao ascending diameter is 3.7 cm.  ·  Image

## 2024-08-07 NOTE — ANESTHESIA POSTPROCEDURE EVALUATION
Department of Anesthesiology  Postprocedure Note    Patient: CAROLINA CESPEDES  MRN: [id number]  YOB: 1970  Date of evaluation: 8/7/2024    Procedure Summary       Date: 08/07/24 Room / Location: [facility] EP LAB / [facility] ELECTROPHYSIOLOGY    Anesthesia Start: 1400 Anesthesia Stop: 1537    Procedure: Insert ICD dual Diagnosis:       V-tach (HCC)      (V-tach (HCC) [I47.20])    Providers: Hallie Hinojosa MD Responsible Provider: Harpreet Davalos Jr., MD    Anesthesia Type: MAC ASA Status: 4            Anesthesia Type: MAC    Aguila Phase I: Aguila Score: 10    Aguila Phase II:      Anesthesia Post Evaluation    Patient location during evaluation: ICU  Patient participation: complete - patient participated  Level of consciousness: sedated and ventilated  Pain score: 0  Airway patency: patent  Nausea & Vomiting: no vomiting and no nausea  Cardiovascular status: hemodynamically stable  Respiratory status: acceptable  Hydration status: stable  Multimodal analgesia pain management approach    There were no known notable events for this encounter.

## 2024-08-08 ENCOUNTER — APPOINTMENT (OUTPATIENT)
Facility: HOSPITAL | Age: 54
DRG: 275 | End: 2024-08-08
Attending: INTERNAL MEDICINE
Payer: COMMERCIAL

## 2024-08-08 LAB
ANION GAP SERPL CALC-SCNC: 5 MMOL/L (ref 5–15)
APTT PPP: 33.1 SEC (ref 21.2–34.1)
BACTERIA SPEC CULT: NORMAL
BASOPHILS # BLD: 0.1 K/UL (ref 0–0.1)
BASOPHILS NFR BLD: 0 % (ref 0–1)
BNP SERPL-MCNC: 1120 PG/ML
BUN SERPL-MCNC: 15 MG/DL (ref 6–20)
BUN/CREAT SERPL: 20 (ref 12–20)
CA-I BLD-MCNC: 9 MG/DL (ref 8.5–10.1)
CHLORIDE SERPL-SCNC: 105 MMOL/L (ref 97–108)
CO2 SERPL-SCNC: 27 MMOL/L (ref 21–32)
CREAT SERPL-MCNC: 0.76 MG/DL (ref 0.7–1.3)
DIFFERENTIAL METHOD BLD: ABNORMAL
ECHO BSA: 1.92 M2
EKG ATRIAL RATE: 87 BPM
EKG DIAGNOSIS: NORMAL
EKG P AXIS: 28 DEGREES
EKG P-R INTERVAL: 178 MS
EKG Q-T INTERVAL: 392 MS
EKG QRS DURATION: 110 MS
EKG QTC CALCULATION (BAZETT): 471 MS
EKG R AXIS: -43 DEGREES
EKG T AXIS: 108 DEGREES
EKG VENTRICULAR RATE: 87 BPM
EOSINOPHIL # BLD: 0.3 K/UL (ref 0–0.4)
EOSINOPHIL NFR BLD: 2 % (ref 0–7)
ERYTHROCYTE [DISTWIDTH] IN BLOOD BY AUTOMATED COUNT: 13.3 % (ref 11.5–14.5)
ERYTHROCYTE [DISTWIDTH] IN BLOOD BY AUTOMATED COUNT: 13.5 % (ref 11.5–14.5)
GLUCOSE BLD STRIP.AUTO-MCNC: 129 MG/DL (ref 65–100)
GLUCOSE BLD STRIP.AUTO-MCNC: 142 MG/DL (ref 65–100)
GLUCOSE BLD STRIP.AUTO-MCNC: 162 MG/DL (ref 65–100)
GLUCOSE BLD STRIP.AUTO-MCNC: 99 MG/DL (ref 65–100)
GLUCOSE SERPL-MCNC: 114 MG/DL (ref 65–100)
HCT VFR BLD AUTO: 44.7 % (ref 36.6–50.3)
HCT VFR BLD AUTO: 45.1 % (ref 36.6–50.3)
HGB BLD-MCNC: 14.8 G/DL (ref 12.1–17)
HGB BLD-MCNC: 15 G/DL (ref 12.1–17)
IMM GRANULOCYTES # BLD AUTO: 0 K/UL (ref 0–0.04)
IMM GRANULOCYTES NFR BLD AUTO: 0 % (ref 0–0.5)
INR PPP: 1.1 (ref 0.9–1.1)
LYMPHOCYTES # BLD: 2.1 K/UL (ref 0.8–3.5)
LYMPHOCYTES NFR BLD: 18 % (ref 12–49)
Lab: NORMAL
MAGNESIUM SERPL-MCNC: 1.9 MG/DL (ref 1.6–2.4)
MCH RBC QN AUTO: 28.5 PG (ref 26–34)
MCH RBC QN AUTO: 28.7 PG (ref 26–34)
MCHC RBC AUTO-ENTMCNC: 32.8 G/DL (ref 30–36.5)
MCHC RBC AUTO-ENTMCNC: 33.6 G/DL (ref 30–36.5)
MCV RBC AUTO: 85.6 FL (ref 80–99)
MCV RBC AUTO: 86.7 FL (ref 80–99)
MONOCYTES # BLD: 1.1 K/UL (ref 0–1)
MONOCYTES NFR BLD: 9 % (ref 5–13)
NEUTS SEG # BLD: 8 K/UL (ref 1.8–8)
NEUTS SEG NFR BLD: 71 % (ref 32–75)
NRBC # BLD: 0 K/UL (ref 0–0.01)
NRBC # BLD: 0 K/UL (ref 0–0.01)
NRBC BLD-RTO: 0 PER 100 WBC
NRBC BLD-RTO: 0 PER 100 WBC
PERFORMED BY:: ABNORMAL
PERFORMED BY:: NORMAL
PLATELET # BLD AUTO: 263 K/UL (ref 150–400)
PLATELET # BLD AUTO: 292 K/UL (ref 150–400)
PMV BLD AUTO: 10 FL (ref 8.9–12.9)
PMV BLD AUTO: 10.1 FL (ref 8.9–12.9)
POTASSIUM SERPL-SCNC: 3.4 MMOL/L (ref 3.5–5.1)
PROTHROMBIN TIME: 14.6 SEC (ref 11.9–14.6)
RBC # BLD AUTO: 5.2 M/UL (ref 4.1–5.7)
RBC # BLD AUTO: 5.22 M/UL (ref 4.1–5.7)
SODIUM SERPL-SCNC: 137 MMOL/L (ref 136–145)
THERAPEUTIC RANGE: NORMAL SEC (ref 82–109)
TROPONIN I SERPL HS-MCNC: 889 NG/L (ref 0–76)
WBC # BLD AUTO: 11.5 K/UL (ref 4.1–11.1)
WBC # BLD AUTO: 12.1 K/UL (ref 4.1–11.1)

## 2024-08-08 PROCEDURE — 36415 COLL VENOUS BLD VENIPUNCTURE: CPT

## 2024-08-08 PROCEDURE — 85025 COMPLETE CBC W/AUTO DIFF WBC: CPT

## 2024-08-08 PROCEDURE — 6360000002 HC RX W HCPCS: Performed by: INTERNAL MEDICINE

## 2024-08-08 PROCEDURE — 85027 COMPLETE CBC AUTOMATED: CPT

## 2024-08-08 PROCEDURE — 85610 PROTHROMBIN TIME: CPT

## 2024-08-08 PROCEDURE — 84484 ASSAY OF TROPONIN QUANT: CPT

## 2024-08-08 PROCEDURE — 83735 ASSAY OF MAGNESIUM: CPT

## 2024-08-08 PROCEDURE — 6370000000 HC RX 637 (ALT 250 FOR IP): Performed by: ANESTHESIOLOGY

## 2024-08-08 PROCEDURE — 99222 1ST HOSP IP/OBS MODERATE 55: CPT | Performed by: PSYCHIATRY & NEUROLOGY

## 2024-08-08 PROCEDURE — 85730 THROMBOPLASTIN TIME PARTIAL: CPT

## 2024-08-08 PROCEDURE — 6370000000 HC RX 637 (ALT 250 FOR IP): Performed by: INTERNAL MEDICINE

## 2024-08-08 PROCEDURE — 83880 ASSAY OF NATRIURETIC PEPTIDE: CPT

## 2024-08-08 PROCEDURE — 93005 ELECTROCARDIOGRAM TRACING: CPT | Performed by: INTERNAL MEDICINE

## 2024-08-08 PROCEDURE — 82962 GLUCOSE BLOOD TEST: CPT

## 2024-08-08 PROCEDURE — 2580000003 HC RX 258: Performed by: INTERNAL MEDICINE

## 2024-08-08 PROCEDURE — 93971 EXTREMITY STUDY: CPT

## 2024-08-08 PROCEDURE — 2000000000 HC ICU R&B

## 2024-08-08 PROCEDURE — 80048 BASIC METABOLIC PNL TOTAL CA: CPT

## 2024-08-08 RX ORDER — SODIUM CHLORIDE 0.9 % (FLUSH) 0.9 %
5-40 SYRINGE (ML) INJECTION EVERY 12 HOURS SCHEDULED
Status: DISCONTINUED | OUTPATIENT
Start: 2024-08-08 | End: 2024-08-08 | Stop reason: SDUPTHER

## 2024-08-08 RX ORDER — HEPARIN SODIUM 10000 [USP'U]/100ML
5-30 INJECTION, SOLUTION INTRAVENOUS CONTINUOUS
Status: DISCONTINUED | OUTPATIENT
Start: 2024-08-08 | End: 2024-08-11

## 2024-08-08 RX ORDER — ENOXAPARIN SODIUM 100 MG/ML
40 INJECTION SUBCUTANEOUS DAILY
Status: DISCONTINUED | OUTPATIENT
Start: 2024-08-08 | End: 2024-08-08

## 2024-08-08 RX ORDER — POTASSIUM CHLORIDE 20 MEQ/1
40 TABLET, EXTENDED RELEASE ORAL ONCE
Status: COMPLETED | OUTPATIENT
Start: 2024-08-08 | End: 2024-08-08

## 2024-08-08 RX ORDER — IPRATROPIUM BROMIDE AND ALBUTEROL SULFATE 2.5; .5 MG/3ML; MG/3ML
1 SOLUTION RESPIRATORY (INHALATION)
Status: DISCONTINUED | OUTPATIENT
Start: 2024-08-08 | End: 2024-08-09

## 2024-08-08 RX ORDER — HYDROMORPHONE HYDROCHLORIDE 1 MG/ML
0.5 INJECTION, SOLUTION INTRAMUSCULAR; INTRAVENOUS; SUBCUTANEOUS EVERY 5 MIN PRN
Status: DISCONTINUED | OUTPATIENT
Start: 2024-08-08 | End: 2024-08-09

## 2024-08-08 RX ORDER — DIPHENHYDRAMINE HYDROCHLORIDE 50 MG/ML
12.5 INJECTION INTRAMUSCULAR; INTRAVENOUS
Status: DISCONTINUED | OUTPATIENT
Start: 2024-08-08 | End: 2024-08-09

## 2024-08-08 RX ORDER — METOCLOPRAMIDE HYDROCHLORIDE 5 MG/ML
10 INJECTION INTRAMUSCULAR; INTRAVENOUS
Status: DISCONTINUED | OUTPATIENT
Start: 2024-08-08 | End: 2024-08-08 | Stop reason: SDUPTHER

## 2024-08-08 RX ORDER — OXYCODONE HYDROCHLORIDE 5 MG/1
5 TABLET ORAL PRN
Status: COMPLETED | OUTPATIENT
Start: 2024-08-08 | End: 2024-08-08

## 2024-08-08 RX ORDER — LABETALOL HYDROCHLORIDE 5 MG/ML
10 INJECTION, SOLUTION INTRAVENOUS
Status: DISCONTINUED | OUTPATIENT
Start: 2024-08-08 | End: 2024-08-09

## 2024-08-08 RX ORDER — HYDRALAZINE HYDROCHLORIDE 20 MG/ML
10 INJECTION INTRAMUSCULAR; INTRAVENOUS
Status: DISCONTINUED | OUTPATIENT
Start: 2024-08-08 | End: 2024-08-09

## 2024-08-08 RX ORDER — NALOXONE HYDROCHLORIDE 0.4 MG/ML
INJECTION, SOLUTION INTRAMUSCULAR; INTRAVENOUS; SUBCUTANEOUS PRN
Status: DISCONTINUED | OUTPATIENT
Start: 2024-08-08 | End: 2024-08-08 | Stop reason: SDUPTHER

## 2024-08-08 RX ORDER — GLUCAGON 1 MG/ML
1 KIT INJECTION PRN
Status: DISCONTINUED | OUTPATIENT
Start: 2024-08-08 | End: 2024-08-08 | Stop reason: SDUPTHER

## 2024-08-08 RX ORDER — SODIUM CHLORIDE 9 MG/ML
INJECTION, SOLUTION INTRAVENOUS PRN
Status: DISCONTINUED | OUTPATIENT
Start: 2024-08-08 | End: 2024-08-08 | Stop reason: SDUPTHER

## 2024-08-08 RX ORDER — LIDOCAINE 4 G/G
1 PATCH TOPICAL AS NEEDED
Status: COMPLETED | OUTPATIENT
Start: 2024-08-08 | End: 2024-08-09

## 2024-08-08 RX ORDER — SODIUM CHLORIDE 0.9 % (FLUSH) 0.9 %
5-40 SYRINGE (ML) INJECTION PRN
Status: DISCONTINUED | OUTPATIENT
Start: 2024-08-08 | End: 2024-08-08 | Stop reason: SDUPTHER

## 2024-08-08 RX ORDER — LORAZEPAM 2 MG/ML
0.5 INJECTION INTRAMUSCULAR
Status: DISCONTINUED | OUTPATIENT
Start: 2024-08-08 | End: 2024-08-09

## 2024-08-08 RX ORDER — SODIUM CHLORIDE, SODIUM LACTATE, POTASSIUM CHLORIDE, CALCIUM CHLORIDE 600; 310; 30; 20 MG/100ML; MG/100ML; MG/100ML; MG/100ML
INJECTION, SOLUTION INTRAVENOUS ONCE
Status: DISCONTINUED | OUTPATIENT
Start: 2024-08-08 | End: 2024-08-09

## 2024-08-08 RX ORDER — ONDANSETRON 2 MG/ML
4 INJECTION INTRAMUSCULAR; INTRAVENOUS
Status: DISCONTINUED | OUTPATIENT
Start: 2024-08-08 | End: 2024-08-09

## 2024-08-08 RX ORDER — MEPERIDINE HYDROCHLORIDE 25 MG/ML
12.5 INJECTION INTRAMUSCULAR; INTRAVENOUS; SUBCUTANEOUS EVERY 5 MIN PRN
Status: DISCONTINUED | OUTPATIENT
Start: 2024-08-08 | End: 2024-08-08 | Stop reason: SDUPTHER

## 2024-08-08 RX ORDER — FENTANYL CITRATE 50 UG/ML
50 INJECTION, SOLUTION INTRAMUSCULAR; INTRAVENOUS EVERY 5 MIN PRN
Status: DISCONTINUED | OUTPATIENT
Start: 2024-08-08 | End: 2024-08-08 | Stop reason: SDUPTHER

## 2024-08-08 RX ORDER — FUROSEMIDE 40 MG/1
40 TABLET ORAL DAILY
Status: DISCONTINUED | OUTPATIENT
Start: 2024-08-08 | End: 2024-08-14 | Stop reason: HOSPADM

## 2024-08-08 RX ORDER — HEPARIN SODIUM 1000 [USP'U]/ML
80 INJECTION, SOLUTION INTRAVENOUS; SUBCUTANEOUS PRN
Status: DISCONTINUED | OUTPATIENT
Start: 2024-08-08 | End: 2024-08-08

## 2024-08-08 RX ORDER — HEPARIN SODIUM 1000 [USP'U]/ML
80 INJECTION, SOLUTION INTRAVENOUS; SUBCUTANEOUS ONCE
Status: DISCONTINUED | OUTPATIENT
Start: 2024-08-08 | End: 2024-08-08

## 2024-08-08 RX ORDER — OXYCODONE HYDROCHLORIDE 5 MG/1
10 TABLET ORAL PRN
Status: COMPLETED | OUTPATIENT
Start: 2024-08-08 | End: 2024-08-08

## 2024-08-08 RX ORDER — HEPARIN SODIUM 1000 [USP'U]/ML
40 INJECTION, SOLUTION INTRAVENOUS; SUBCUTANEOUS PRN
Status: DISCONTINUED | OUTPATIENT
Start: 2024-08-08 | End: 2024-08-08

## 2024-08-08 RX ORDER — DEXTROSE MONOHYDRATE 100 MG/ML
INJECTION, SOLUTION INTRAVENOUS CONTINUOUS PRN
Status: DISCONTINUED | OUTPATIENT
Start: 2024-08-08 | End: 2024-08-08 | Stop reason: SDUPTHER

## 2024-08-08 RX ADMIN — ASPIRIN 81 MG CHEWABLE TABLET 81 MG: 81 TABLET CHEWABLE at 09:11

## 2024-08-08 RX ADMIN — FENTANYL CITRATE 50 MCG: 50 INJECTION, SOLUTION INTRAMUSCULAR; INTRAVENOUS at 07:49

## 2024-08-08 RX ADMIN — SACUBITRIL AND VALSARTAN 1 TABLET: 49; 51 TABLET, FILM COATED ORAL at 09:11

## 2024-08-08 RX ADMIN — CARVEDILOL 12.5 MG: 12.5 TABLET, FILM COATED ORAL at 09:11

## 2024-08-08 RX ADMIN — OXYCODONE HYDROCHLORIDE 10 MG: 5 TABLET ORAL at 10:21

## 2024-08-08 RX ADMIN — POTASSIUM CHLORIDE 40 MEQ: 1500 TABLET, EXTENDED RELEASE ORAL at 09:11

## 2024-08-08 RX ADMIN — SACUBITRIL AND VALSARTAN 1 TABLET: 49; 51 TABLET, FILM COATED ORAL at 21:25

## 2024-08-08 RX ADMIN — SODIUM CHLORIDE, PRESERVATIVE FREE 10 ML: 5 INJECTION INTRAVENOUS at 09:11

## 2024-08-08 RX ADMIN — AMIODARONE HYDROCHLORIDE 400 MG: 200 TABLET ORAL at 09:11

## 2024-08-08 RX ADMIN — ATORVASTATIN CALCIUM 40 MG: 40 TABLET, FILM COATED ORAL at 21:25

## 2024-08-08 RX ADMIN — FENTANYL CITRATE 50 MCG: 50 INJECTION, SOLUTION INTRAMUSCULAR; INTRAVENOUS at 02:13

## 2024-08-08 RX ADMIN — HEPARIN SODIUM 18 UNITS/KG/HR: 10000 INJECTION, SOLUTION INTRAVENOUS at 17:26

## 2024-08-08 RX ADMIN — FAMOTIDINE 20 MG: 20 TABLET, FILM COATED ORAL at 21:25

## 2024-08-08 RX ADMIN — CARVEDILOL 12.5 MG: 12.5 TABLET, FILM COATED ORAL at 18:01

## 2024-08-08 RX ADMIN — FENTANYL CITRATE 50 MCG: 50 INJECTION, SOLUTION INTRAMUSCULAR; INTRAVENOUS at 19:01

## 2024-08-08 RX ADMIN — FAMOTIDINE 20 MG: 20 TABLET, FILM COATED ORAL at 09:11

## 2024-08-08 RX ADMIN — FUROSEMIDE 40 MG: 40 TABLET ORAL at 13:53

## 2024-08-08 RX ADMIN — AMIODARONE HYDROCHLORIDE 400 MG: 200 TABLET ORAL at 21:25

## 2024-08-08 RX ADMIN — ENOXAPARIN SODIUM 40 MG: 100 INJECTION SUBCUTANEOUS at 13:53

## 2024-08-08 RX ADMIN — THIAMINE HYDROCHLORIDE 100 MG: 100 INJECTION, SOLUTION INTRAMUSCULAR; INTRAVENOUS at 09:11

## 2024-08-08 RX ADMIN — FENTANYL CITRATE 50 MCG: 50 INJECTION, SOLUTION INTRAMUSCULAR; INTRAVENOUS at 13:58

## 2024-08-08 RX ADMIN — SPIRONOLACTONE 25 MG: 25 TABLET ORAL at 09:11

## 2024-08-08 RX ADMIN — SODIUM CHLORIDE, PRESERVATIVE FREE 10 ML: 5 INJECTION INTRAVENOUS at 21:25

## 2024-08-08 ASSESSMENT — PAIN SCALES - GENERAL
PAINLEVEL_OUTOF10: 7
PAINLEVEL_OUTOF10: 5
PAINLEVEL_OUTOF10: 8
PAINLEVEL_OUTOF10: 0
PAINLEVEL_OUTOF10: 3
PAINLEVEL_OUTOF10: 7
PAINLEVEL_OUTOF10: 4
PAINLEVEL_OUTOF10: 3
PAINLEVEL_OUTOF10: 7
PAINLEVEL_OUTOF10: 3
PAINLEVEL_OUTOF10: 4
PAINLEVEL_OUTOF10: 7
PAINLEVEL_OUTOF10: 4
PAINLEVEL_OUTOF10: 4
PAINLEVEL_OUTOF10: 3
PAINLEVEL_OUTOF10: 3

## 2024-08-08 ASSESSMENT — PAIN DESCRIPTION - LOCATION
LOCATION: INCISION;CHEST
LOCATION: CHEST
LOCATION: CHEST;INCISION
LOCATION: CHEST

## 2024-08-08 ASSESSMENT — PAIN DESCRIPTION - ORIENTATION
ORIENTATION: RIGHT
ORIENTATION: LEFT
ORIENTATION: RIGHT
ORIENTATION: RIGHT

## 2024-08-08 ASSESSMENT — PAIN DESCRIPTION - DESCRIPTORS
DESCRIPTORS: SORE
DESCRIPTORS: SORE
DESCRIPTORS: PENETRATING;SHARP

## 2024-08-08 NOTE — CARE COORDINATION
Pending neuro consult.  AICD done yesterday.  Pending PT/OT eval when medically stable.      Patient in need of a PCP.       LALIT Aguirre

## 2024-08-08 NOTE — CONSULTS
Consult    NAME: [place]oy Granado [place]   :  1880   MRN:  [id number]     Date/Time:  2024 3:09 PM    Patient PCP: No primary care provider on file.  ________________________________________________________________________      Subjective:   CHIEF COMPLAINT: STEMI alert was called and I responded.    HISTORY OF PRESENT ILLNESS:   STEMI alert and CODE BLUE was announced and I responded to emergency room.  Information is obtained from paramedics and the daughters of the patient and sister.  Patient is a 54-year-old female and was at home and sitting and suddenly her eyes rolled back and she became unresponsive and her lips turned blue and her better half was in the home and he realized and gave her CPR and called 911.  When paramedics presented her better have continued to give CPR for about 6 minutes.  Patient is found to be in ventricular tachycardia and fibrillation and she was shocked.  Again she went into torsade the point and was given magnesium sulfate.  Patient is brought to emergency room with the CPR in progress.  We did get blood pressure and when I evaluated again patient is in atrial flutter with 2-1 block and cardioverted in the emergency room.  Some information is obtained from the daughters and a sister and that they all are present in the emergency room and I mentioned them that her condition is guarded and I am going to rush her to cardiac Cath Lab for possible STEMI.    No history of chest pain or palpitation or shortness of breath prior to this episode.           No past medical history on file.  History of hypertension however patient does not take medicine and does not see any doctor.    No past surgical history on file.  No Known Allergies   Meds:  See below  Social History     Tobacco Use    Smoking status: Not on file    Smokeless tobacco: Not on file   Substance Use Topics    Alcohol use: Not on file   Negative for smoking, takes social drinking no history of drug abuse.    No 
     [facility] NEUROLOGY CONSULTATION          Chief Complaint/Admission Diagnosis: Tachycardia [R00.0]  STEMI (ST elevation myocardial infarction) ([facility]) [I21.3]  Cardiac arrest with successful resuscitation (HCC) [I46.9]  Cardiac arrest with ventricular fibrillation (HCC) [I46.9, I49.01]     I have been asked to see this 53 y.o. adult in neurological consultation by Renee Berrios MD  to render advice and opinion regarding hypoxic injury.      ?     Impression/Recommendations:   53-year-old female with uncontrolled hypertension, sustained outside of hospital witnessed cardiac arrest.  V-fib was noted, patient received 4 defibrillations with cardioversion, then went to torsade the point and was given magnesium sulfate.  Subsequently converted to sinus rhythm and had ROSC.    Patient is back to baseline and has intact retrograde memory. Can't recall anterograde information but will give her some time to recover from the traumatic event.  There is no reason to do any MRI brain at this point.   Cognitive rest recommended.  Outpatient neurology appointment for follow up.      Kavon Martinez MD  Teleneurologist    HPI:   History was obtained from a review of the electronic record and from the patient and family.??   53-year-old female with uncontrolled hypertension, sustained outside of hospital witnessed cardiac arrest.  V-fib was noted, patient received 4 defibrillations with cardioversion, then went to torsade the point and was given magnesium sulfate.  Subsequently converted to sinus rhythm and had ROSC.  She was intubated in the field.  Underwent cardiac catheterization which showed 60% LAD lesion which is likely not a culprit.  EF 15% per cardiac catheterization, 25% per echo.  ?     Review of Symptoms:     A ten system review of constitutional, cardiovascular, respiratory, musculoskeletal, endocrine, skin, HEENT, genitourinary, neurological, and psychiatric systems was obtained and is negative except as 
Full consult to follow.    Patient presented with VT/VF arrest.  EF 20-25%.  No significant CAD by cath.    Plan for ICD implant for secondary prevention tomorrow afternoon.  NPO after midnight.  
0-8 Units, SubCUTAneous, TID WC  insulin lispro (HUMALOG,ADMELOG) injection vial 0-4 Units, 0-4 Units, SubCUTAneous, Nightly  glucose chewable tablet 16 g, 4 tablet, Oral, PRN  dextrose bolus 10% 125 mL, 125 mL, IntraVENous, PRN **OR** dextrose bolus 10% 250 mL, 250 mL, IntraVENous, PRN  glucagon injection 1 mg, 1 mg, SubCUTAneous, PRN  dextrose 10 % infusion, , IntraVENous, Continuous PRN  spironolactone (ALDACTONE) tablet 25 mg, 25 mg, Oral, Daily  atorvastatin (LIPITOR) tablet 40 mg, 40 mg, Oral, Nightly  aspirin chewable tablet 81 mg, 81 mg, Oral, Daily  sodium chloride flush 0.9 % injection 5-40 mL, 5-40 mL, IntraVENous, 2 times per day  sodium chloride flush 0.9 % injection 5-40 mL, 5-40 mL, IntraVENous, PRN  0.9 % sodium chloride infusion, , IntraVENous, PRN  acetaminophen (TYLENOL) tablet 650 mg, 650 mg, Oral, Q4H PRN  fentaNYL (SUBLIMAZE) injection 50 mcg, 50 mcg, IntraVENous, Q2H PRN  norepinephrine (LEVOPHED) 16 mg in sodium chloride 0.9 % 250 mL infusion, 1-100 mcg/min, IntraVENous, Continuous  Allergies:  Meperidine, Metoclopramide, Naproxen, and Prochlorperazine edisylate    Social History:    TOBACCO:   reports that CAR[place]INA CESPEDES \"Viktoriya\" has never smoked. CAR[place] CESPEDES \"Viktoriya\" does not have any smokeless tobacco history on file.  ETOH:   reports current alcohol use.  DRUGS:   has no history on file for drug use.    Family History:       Problem Relation Age of Onset    Endometrial Cancer Mother     Lung Cancer Father      REVIEW OF SYSTEMS:  A comprehensive review of systems was obtained and pertinent positives are listed in the HPI. All other systems negative.     PHYSICAL EXAM:    Vitals:    VITALS:  BP (!) 168/94   Pulse 85   Temp 98.4 °F (36.9 °C) (Oral)   Resp 26   Ht 1.676 m (5' 6\")   Wt 74.6 kg (164 lb 7.4 oz)   SpO2 98%   BMI 26.55 kg/m²   Gen: No distress  Eyes: EOMI  CV: RRR  Chest: CTAB  Abd: soft  Ext: no edema  Neuro: nonfocal  Skin: no rashes    DATA:      ECG 
Endometrial Cancer Mother     Lung Cancer Father       Social History     Tobacco Use    Smoking status: Never    Smokeless tobacco: Not on file   Substance Use Topics    Alcohol use: Yes     Comment: 1-2 drinks daily     History reviewed. No pertinent surgical history.   Prior to Admission medications    Not on File     No Known Allergies     Review of Systems:  Review of systems not obtained due to patient factors.    Labs:    Recent Labs     24  1318   WBC 14.6*   HGB 15.2   HCT 46.8        Recent Labs     24  1313 24  1318   NA  --  137   K  --  3.5   CL  --  107   CO2  --  18*   GLUCOSE  --  254*   BUN  --  17   CREATININE 0.88 1.17   CALCIUM  --  8.5   MG  --  2.9*   BILITOT  --  0.6   AST  --  509*   ALT  --  445*     No results for input(s): \"PHART\", \"MIA4JON\", \"PO2ART\", \"QQQ2KEP\", \"BEART\", \"ATQ7LXAH\", \"HGBART\", \"RO1VLUDNC\", \"FIO2A\", \"M4QZMJLO\", \"OXYHEM\", \"CARBOXHGBART\", \"METHGBART\", \"E9IZKFFZS\", \"PHCORART\", \"TEMP\" in the last 72 hours.    Invalid input(s): \"CZX3TQXNK\"      Objective:   Blood pressure 127/89, pulse 72, temperature 98.8 °F (37.1 °C), temperature source Oral, resp. rate 16, height 1.676 m (5' 6\"), weight 79.4 kg (175 lb), SpO2 100 %.  Temp (24hrs), Av.8 °F (37.1 °C), Min:98.8 °F (37.1 °C), Max:98.8 °F (37.1 °C)    CT HEAD WO CONTRAST   Final Result   No acute intracranial abnormality. Chronic findings.         Electronically signed by ESPERANZA CHANDLER      XR CHEST 1 VIEW   Final Result   Cardiomegaly. No focal pulmonary process.         Electronically signed by Hussain Membreno MD      CTA CHEST W WO CONTRAST    (Results Pending)      Data Review:   Current Facility-Administered Medications   Medication Dose Route Frequency    Amiodarone HCl in Dextrose 150-4.21 MG/100ML-% SOLN        propofol 1000 MG/100ML infusion        fentaNYL Citrate-NaCl (SUBLIMAZE) 1-0.9 MG/100ML-% infusion        amiodarone (NEXTERONE) 360 mg in dextrose 5% 200 ml  1 mg/min IntraVENous Continuous

## 2024-08-09 LAB
ANION GAP SERPL CALC-SCNC: 5 MMOL/L (ref 5–15)
APTT PPP: 36.3 SEC (ref 21.2–34.1)
APTT PPP: 49.5 SEC (ref 21.2–34.1)
APTT PPP: 54.1 SEC (ref 21.2–34.1)
BUN SERPL-MCNC: 10 MG/DL (ref 6–20)
BUN/CREAT SERPL: 16 (ref 12–20)
CA-I BLD-MCNC: 9 MG/DL (ref 8.5–10.1)
CHLORIDE SERPL-SCNC: 107 MMOL/L (ref 97–108)
CO2 SERPL-SCNC: 25 MMOL/L (ref 21–32)
CREAT SERPL-MCNC: 0.62 MG/DL (ref 0.7–1.3)
EKG ATRIAL RATE: 90 BPM
EKG DIAGNOSIS: NORMAL
EKG P AXIS: -10 DEGREES
EKG P-R INTERVAL: 214 MS
EKG Q-T INTERVAL: 388 MS
EKG QRS DURATION: 108 MS
EKG QTC CALCULATION (BAZETT): 474 MS
EKG R AXIS: -35 DEGREES
EKG T AXIS: 142 DEGREES
EKG VENTRICULAR RATE: 90 BPM
GLUCOSE BLD STRIP.AUTO-MCNC: 112 MG/DL (ref 65–100)
GLUCOSE BLD STRIP.AUTO-MCNC: 114 MG/DL (ref 65–100)
GLUCOSE BLD STRIP.AUTO-MCNC: 117 MG/DL (ref 65–100)
GLUCOSE BLD STRIP.AUTO-MCNC: 150 MG/DL (ref 65–100)
GLUCOSE SERPL-MCNC: 114 MG/DL (ref 65–100)
MAGNESIUM SERPL-MCNC: 1.8 MG/DL (ref 1.6–2.4)
PERFORMED BY:: ABNORMAL
POTASSIUM SERPL-SCNC: 3.6 MMOL/L (ref 3.5–5.1)
SODIUM SERPL-SCNC: 137 MMOL/L (ref 136–145)
THERAPEUTIC RANGE: ABNORMAL SEC (ref 82–109)

## 2024-08-09 PROCEDURE — 6370000000 HC RX 637 (ALT 250 FOR IP): Performed by: INTERNAL MEDICINE

## 2024-08-09 PROCEDURE — 94761 N-INVAS EAR/PLS OXIMETRY MLT: CPT

## 2024-08-09 PROCEDURE — 97530 THERAPEUTIC ACTIVITIES: CPT

## 2024-08-09 PROCEDURE — 36415 COLL VENOUS BLD VENIPUNCTURE: CPT

## 2024-08-09 PROCEDURE — 6360000002 HC RX W HCPCS: Performed by: INTERNAL MEDICINE

## 2024-08-09 PROCEDURE — 80048 BASIC METABOLIC PNL TOTAL CA: CPT

## 2024-08-09 PROCEDURE — 97165 OT EVAL LOW COMPLEX 30 MIN: CPT

## 2024-08-09 PROCEDURE — 2580000003 HC RX 258: Performed by: INTERNAL MEDICINE

## 2024-08-09 PROCEDURE — 83735 ASSAY OF MAGNESIUM: CPT

## 2024-08-09 PROCEDURE — 97161 PT EVAL LOW COMPLEX 20 MIN: CPT

## 2024-08-09 PROCEDURE — 2060000000 HC ICU INTERMEDIATE R&B

## 2024-08-09 PROCEDURE — 82962 GLUCOSE BLOOD TEST: CPT

## 2024-08-09 PROCEDURE — 2500000003 HC RX 250 WO HCPCS: Performed by: ANESTHESIOLOGY

## 2024-08-09 PROCEDURE — 85730 THROMBOPLASTIN TIME PARTIAL: CPT

## 2024-08-09 RX ORDER — DOXYCYCLINE HYCLATE 100 MG/1
100 CAPSULE ORAL EVERY 12 HOURS SCHEDULED
Status: DISCONTINUED | OUTPATIENT
Start: 2024-08-09 | End: 2024-08-14 | Stop reason: HOSPADM

## 2024-08-09 RX ORDER — HEPARIN SODIUM 1000 [USP'U]/ML
40 INJECTION, SOLUTION INTRAVENOUS; SUBCUTANEOUS PRN
Status: DISCONTINUED | OUTPATIENT
Start: 2024-08-09 | End: 2024-08-11

## 2024-08-09 RX ORDER — HEPARIN SODIUM 1000 [USP'U]/ML
80 INJECTION, SOLUTION INTRAVENOUS; SUBCUTANEOUS PRN
Status: DISCONTINUED | OUTPATIENT
Start: 2024-08-09 | End: 2024-08-11

## 2024-08-09 RX ADMIN — HEPARIN SODIUM 24 UNITS/KG/HR: 10000 INJECTION, SOLUTION INTRAVENOUS at 18:23

## 2024-08-09 RX ADMIN — AMIODARONE HYDROCHLORIDE 400 MG: 200 TABLET ORAL at 09:10

## 2024-08-09 RX ADMIN — FENTANYL CITRATE 50 MCG: 50 INJECTION, SOLUTION INTRAMUSCULAR; INTRAVENOUS at 14:18

## 2024-08-09 RX ADMIN — HEPARIN SODIUM 3000 UNITS: 1000 INJECTION INTRAVENOUS; SUBCUTANEOUS at 11:07

## 2024-08-09 RX ADMIN — FAMOTIDINE 20 MG: 20 TABLET, FILM COATED ORAL at 21:31

## 2024-08-09 RX ADMIN — ACETAMINOPHEN 650 MG: 325 TABLET ORAL at 21:36

## 2024-08-09 RX ADMIN — CARVEDILOL 12.5 MG: 12.5 TABLET, FILM COATED ORAL at 09:10

## 2024-08-09 RX ADMIN — SACUBITRIL AND VALSARTAN 1 TABLET: 49; 51 TABLET, FILM COATED ORAL at 09:09

## 2024-08-09 RX ADMIN — HEPARIN SODIUM 3000 UNITS: 1000 INJECTION INTRAVENOUS; SUBCUTANEOUS at 03:08

## 2024-08-09 RX ADMIN — FENTANYL CITRATE 50 MCG: 50 INJECTION, SOLUTION INTRAMUSCULAR; INTRAVENOUS at 00:22

## 2024-08-09 RX ADMIN — CARVEDILOL 12.5 MG: 12.5 TABLET, FILM COATED ORAL at 17:17

## 2024-08-09 RX ADMIN — FENTANYL CITRATE 50 MCG: 50 INJECTION, SOLUTION INTRAMUSCULAR; INTRAVENOUS at 09:09

## 2024-08-09 RX ADMIN — SODIUM CHLORIDE, PRESERVATIVE FREE 10 ML: 5 INJECTION INTRAVENOUS at 09:13

## 2024-08-09 RX ADMIN — AMIODARONE HYDROCHLORIDE 400 MG: 200 TABLET ORAL at 21:31

## 2024-08-09 RX ADMIN — HEPARIN SODIUM 22 UNITS/KG/HR: 10000 INJECTION, SOLUTION INTRAVENOUS at 11:01

## 2024-08-09 RX ADMIN — HEPARIN SODIUM 3000 UNITS: 1000 INJECTION INTRAVENOUS; SUBCUTANEOUS at 18:22

## 2024-08-09 RX ADMIN — FAMOTIDINE 20 MG: 20 TABLET, FILM COATED ORAL at 09:09

## 2024-08-09 RX ADMIN — DOXYCYCLINE HYCLATE 100 MG: 100 CAPSULE ORAL at 21:31

## 2024-08-09 RX ADMIN — ASPIRIN 81 MG CHEWABLE TABLET 81 MG: 81 TABLET CHEWABLE at 09:10

## 2024-08-09 RX ADMIN — SACUBITRIL AND VALSARTAN 1 TABLET: 49; 51 TABLET, FILM COATED ORAL at 21:31

## 2024-08-09 RX ADMIN — HYDROMORPHONE HYDROCHLORIDE 0.5 MG: 1 INJECTION, SOLUTION INTRAMUSCULAR; INTRAVENOUS; SUBCUTANEOUS at 05:19

## 2024-08-09 RX ADMIN — THIAMINE HYDROCHLORIDE 100 MG: 100 INJECTION, SOLUTION INTRAMUSCULAR; INTRAVENOUS at 09:10

## 2024-08-09 RX ADMIN — SPIRONOLACTONE 25 MG: 25 TABLET ORAL at 09:10

## 2024-08-09 RX ADMIN — FUROSEMIDE 40 MG: 40 TABLET ORAL at 09:10

## 2024-08-09 RX ADMIN — ATORVASTATIN CALCIUM 40 MG: 40 TABLET, FILM COATED ORAL at 21:31

## 2024-08-09 ASSESSMENT — PAIN SCALES - GENERAL
PAINLEVEL_OUTOF10: 0
PAINLEVEL_OUTOF10: 7
PAINLEVEL_OUTOF10: 8
PAINLEVEL_OUTOF10: 3
PAINLEVEL_OUTOF10: 7
PAINLEVEL_OUTOF10: 0
PAINLEVEL_OUTOF10: 0
PAINLEVEL_OUTOF10: 4
PAINLEVEL_OUTOF10: 0
PAINLEVEL_OUTOF10: 7
PAINLEVEL_OUTOF10: 0
PAINLEVEL_OUTOF10: 4
PAINLEVEL_OUTOF10: 10
PAINLEVEL_OUTOF10: 0

## 2024-08-09 ASSESSMENT — PAIN DESCRIPTION - ORIENTATION
ORIENTATION: RIGHT
ORIENTATION: RIGHT
ORIENTATION: RIGHT;LEFT
ORIENTATION: RIGHT

## 2024-08-09 ASSESSMENT — PAIN DESCRIPTION - LOCATION
LOCATION: CHEST

## 2024-08-09 ASSESSMENT — PAIN DESCRIPTION - DESCRIPTORS
DESCRIPTORS: SORE
DESCRIPTORS: SORE
DESCRIPTORS: ACHING

## 2024-08-09 NOTE — CARE COORDINATION
13:45PM Met w/patient at the bedside to inform of therapy recommendation, for HH at discharge.  Patient acknowledged understanding.  Patient has no established PCP, as she hasn't been to his office in over three years.  Explained the importance of finding a new PCP and establishing care w/a PCP.  Further explained once she's established w/a PCP, they will be able to sign off on HH orders.  Patient acknowledged understanding.     LALIT Aguirre      13:13PM PT/OT siva recommends HH.  Will meet w/patient to inquire if she has a PCP.  Patient will need to have an established PCP, for HH at time of discharge.       LALIT Aguirre

## 2024-08-10 LAB
ALBUMIN SERPL-MCNC: 2.7 G/DL (ref 3.5–5)
ANION GAP SERPL CALC-SCNC: 8 MMOL/L (ref 5–15)
APTT PPP: 85.9 SEC (ref 21.2–34.1)
APTT PPP: 87.3 SEC (ref 21.2–34.1)
BUN SERPL-MCNC: 10 MG/DL (ref 6–20)
BUN/CREAT SERPL: 14 (ref 12–20)
CA-I BLD-MCNC: 9.5 MG/DL (ref 8.5–10.1)
CHLORIDE SERPL-SCNC: 105 MMOL/L (ref 97–108)
CO2 SERPL-SCNC: 25 MMOL/L (ref 21–32)
CREAT SERPL-MCNC: 0.74 MG/DL (ref 0.7–1.3)
ERYTHROCYTE [DISTWIDTH] IN BLOOD BY AUTOMATED COUNT: 13.3 % (ref 11.5–14.5)
GLUCOSE BLD STRIP.AUTO-MCNC: 110 MG/DL (ref 65–100)
GLUCOSE BLD STRIP.AUTO-MCNC: 110 MG/DL (ref 65–100)
GLUCOSE BLD STRIP.AUTO-MCNC: 123 MG/DL (ref 65–100)
GLUCOSE BLD STRIP.AUTO-MCNC: 134 MG/DL (ref 65–100)
GLUCOSE SERPL-MCNC: 134 MG/DL (ref 65–100)
HCT VFR BLD AUTO: 43.1 % (ref 36.6–50.3)
HGB BLD-MCNC: 14.4 G/DL (ref 12.1–17)
MAGNESIUM SERPL-MCNC: 1.9 MG/DL (ref 1.6–2.4)
MCH RBC QN AUTO: 28.9 PG (ref 26–34)
MCHC RBC AUTO-ENTMCNC: 33.4 G/DL (ref 30–36.5)
MCV RBC AUTO: 86.4 FL (ref 80–99)
NRBC # BLD: 0 K/UL (ref 0–0.01)
NRBC BLD-RTO: 0 PER 100 WBC
PERFORMED BY:: ABNORMAL
PHOSPHATE SERPL-MCNC: 3.4 MG/DL (ref 2.6–4.7)
PLATELET # BLD AUTO: 266 K/UL (ref 150–400)
PMV BLD AUTO: 9.5 FL (ref 8.9–12.9)
POTASSIUM SERPL-SCNC: 3.5 MMOL/L (ref 3.5–5.1)
RBC # BLD AUTO: 4.99 M/UL (ref 4.1–5.7)
SODIUM SERPL-SCNC: 138 MMOL/L (ref 136–145)
THERAPEUTIC RANGE: ABNORMAL SEC (ref 82–109)
THERAPEUTIC RANGE: ABNORMAL SEC (ref 82–109)
WBC # BLD AUTO: 11.4 K/UL (ref 4.1–11.1)

## 2024-08-10 PROCEDURE — 83735 ASSAY OF MAGNESIUM: CPT

## 2024-08-10 PROCEDURE — 2580000003 HC RX 258: Performed by: INTERNAL MEDICINE

## 2024-08-10 PROCEDURE — 85730 THROMBOPLASTIN TIME PARTIAL: CPT

## 2024-08-10 PROCEDURE — 36415 COLL VENOUS BLD VENIPUNCTURE: CPT

## 2024-08-10 PROCEDURE — 6360000002 HC RX W HCPCS: Performed by: INTERNAL MEDICINE

## 2024-08-10 PROCEDURE — 82962 GLUCOSE BLOOD TEST: CPT

## 2024-08-10 PROCEDURE — 2060000000 HC ICU INTERMEDIATE R&B

## 2024-08-10 PROCEDURE — 85027 COMPLETE CBC AUTOMATED: CPT

## 2024-08-10 PROCEDURE — 6370000000 HC RX 637 (ALT 250 FOR IP): Performed by: INTERNAL MEDICINE

## 2024-08-10 PROCEDURE — 80069 RENAL FUNCTION PANEL: CPT

## 2024-08-10 RX ADMIN — ACETAMINOPHEN 650 MG: 325 TABLET ORAL at 21:46

## 2024-08-10 RX ADMIN — SPIRONOLACTONE 25 MG: 25 TABLET ORAL at 08:36

## 2024-08-10 RX ADMIN — HEPARIN SODIUM 24 UNITS/KG/HR: 10000 INJECTION, SOLUTION INTRAVENOUS at 17:42

## 2024-08-10 RX ADMIN — SACUBITRIL AND VALSARTAN 1 TABLET: 49; 51 TABLET, FILM COATED ORAL at 08:36

## 2024-08-10 RX ADMIN — AMIODARONE HYDROCHLORIDE 400 MG: 200 TABLET ORAL at 21:46

## 2024-08-10 RX ADMIN — DOXYCYCLINE HYCLATE 100 MG: 100 CAPSULE ORAL at 08:36

## 2024-08-10 RX ADMIN — SODIUM CHLORIDE, PRESERVATIVE FREE 10 ML: 5 INJECTION INTRAVENOUS at 08:36

## 2024-08-10 RX ADMIN — CARVEDILOL 12.5 MG: 12.5 TABLET, FILM COATED ORAL at 08:36

## 2024-08-10 RX ADMIN — ASPIRIN 81 MG CHEWABLE TABLET 81 MG: 81 TABLET CHEWABLE at 08:36

## 2024-08-10 RX ADMIN — FAMOTIDINE 20 MG: 20 TABLET, FILM COATED ORAL at 21:46

## 2024-08-10 RX ADMIN — FAMOTIDINE 20 MG: 20 TABLET, FILM COATED ORAL at 08:36

## 2024-08-10 RX ADMIN — ATORVASTATIN CALCIUM 40 MG: 40 TABLET, FILM COATED ORAL at 21:46

## 2024-08-10 RX ADMIN — AMIODARONE HYDROCHLORIDE 400 MG: 200 TABLET ORAL at 08:35

## 2024-08-10 RX ADMIN — SACUBITRIL AND VALSARTAN 1 TABLET: 49; 51 TABLET, FILM COATED ORAL at 21:45

## 2024-08-10 RX ADMIN — FUROSEMIDE 40 MG: 40 TABLET ORAL at 08:36

## 2024-08-10 RX ADMIN — CARVEDILOL 12.5 MG: 12.5 TABLET, FILM COATED ORAL at 17:42

## 2024-08-10 RX ADMIN — HEPARIN SODIUM 24 UNITS/KG/HR: 10000 INJECTION, SOLUTION INTRAVENOUS at 03:23

## 2024-08-10 RX ADMIN — THIAMINE HYDROCHLORIDE 100 MG: 100 INJECTION, SOLUTION INTRAMUSCULAR; INTRAVENOUS at 10:18

## 2024-08-10 RX ADMIN — DOXYCYCLINE HYCLATE 100 MG: 100 CAPSULE ORAL at 21:46

## 2024-08-10 ASSESSMENT — PAIN DESCRIPTION - DESCRIPTORS: DESCRIPTORS: ACHING

## 2024-08-10 ASSESSMENT — PAIN SCALES - GENERAL
PAINLEVEL_OUTOF10: 0
PAINLEVEL_OUTOF10: 8

## 2024-08-10 ASSESSMENT — PAIN DESCRIPTION - LOCATION: LOCATION: STERNUM;RIB CAGE

## 2024-08-11 LAB
ALBUMIN SERPL-MCNC: 2.6 G/DL (ref 3.5–5)
ANION GAP SERPL CALC-SCNC: 7 MMOL/L (ref 5–15)
APTT PPP: 36.5 SEC (ref 21.2–34.1)
APTT PPP: >153 SEC (ref 21.2–34.1)
BUN SERPL-MCNC: 15 MG/DL (ref 6–20)
BUN/CREAT SERPL: 19 (ref 12–20)
CA-I BLD-MCNC: 9.1 MG/DL (ref 8.5–10.1)
CHLORIDE SERPL-SCNC: 104 MMOL/L (ref 97–108)
CO2 SERPL-SCNC: 26 MMOL/L (ref 21–32)
CREAT SERPL-MCNC: 0.77 MG/DL (ref 0.7–1.3)
GLUCOSE BLD STRIP.AUTO-MCNC: 105 MG/DL (ref 65–100)
GLUCOSE BLD STRIP.AUTO-MCNC: 116 MG/DL (ref 65–100)
GLUCOSE BLD STRIP.AUTO-MCNC: 116 MG/DL (ref 65–100)
GLUCOSE BLD STRIP.AUTO-MCNC: 132 MG/DL (ref 65–100)
GLUCOSE SERPL-MCNC: 204 MG/DL (ref 65–100)
MAGNESIUM SERPL-MCNC: 1.8 MG/DL (ref 1.6–2.4)
PERFORMED BY:: ABNORMAL
PHOSPHATE SERPL-MCNC: 3.2 MG/DL (ref 2.6–4.7)
POTASSIUM SERPL-SCNC: 3.6 MMOL/L (ref 3.5–5.1)
SODIUM SERPL-SCNC: 137 MMOL/L (ref 136–145)
THERAPEUTIC RANGE: ABNORMAL SEC (ref 82–109)
THERAPEUTIC RANGE: ABNORMAL SEC (ref 82–109)

## 2024-08-11 PROCEDURE — 36415 COLL VENOUS BLD VENIPUNCTURE: CPT

## 2024-08-11 PROCEDURE — 80069 RENAL FUNCTION PANEL: CPT

## 2024-08-11 PROCEDURE — 6360000002 HC RX W HCPCS: Performed by: INTERNAL MEDICINE

## 2024-08-11 PROCEDURE — 6370000000 HC RX 637 (ALT 250 FOR IP): Performed by: INTERNAL MEDICINE

## 2024-08-11 PROCEDURE — 82962 GLUCOSE BLOOD TEST: CPT

## 2024-08-11 PROCEDURE — 2580000003 HC RX 258: Performed by: INTERNAL MEDICINE

## 2024-08-11 PROCEDURE — 2060000000 HC ICU INTERMEDIATE R&B

## 2024-08-11 PROCEDURE — 83735 ASSAY OF MAGNESIUM: CPT

## 2024-08-11 PROCEDURE — 85730 THROMBOPLASTIN TIME PARTIAL: CPT

## 2024-08-11 PROCEDURE — 6370000000 HC RX 637 (ALT 250 FOR IP): Performed by: HOSPITALIST

## 2024-08-11 RX ADMIN — CARVEDILOL 12.5 MG: 12.5 TABLET, FILM COATED ORAL at 08:02

## 2024-08-11 RX ADMIN — FAMOTIDINE 20 MG: 20 TABLET, FILM COATED ORAL at 20:54

## 2024-08-11 RX ADMIN — ACETAMINOPHEN 650 MG: 325 TABLET ORAL at 21:16

## 2024-08-11 RX ADMIN — DOXYCYCLINE HYCLATE 100 MG: 100 CAPSULE ORAL at 07:57

## 2024-08-11 RX ADMIN — THIAMINE HYDROCHLORIDE 100 MG: 100 INJECTION, SOLUTION INTRAMUSCULAR; INTRAVENOUS at 07:55

## 2024-08-11 RX ADMIN — SACUBITRIL AND VALSARTAN 1 TABLET: 49; 51 TABLET, FILM COATED ORAL at 20:54

## 2024-08-11 RX ADMIN — SODIUM CHLORIDE, PRESERVATIVE FREE 10 ML: 5 INJECTION INTRAVENOUS at 20:55

## 2024-08-11 RX ADMIN — HEPARIN SODIUM 21 UNITS/KG/HR: 10000 INJECTION, SOLUTION INTRAVENOUS at 12:15

## 2024-08-11 RX ADMIN — ATORVASTATIN CALCIUM 40 MG: 40 TABLET, FILM COATED ORAL at 20:54

## 2024-08-11 RX ADMIN — SPIRONOLACTONE 25 MG: 25 TABLET ORAL at 07:57

## 2024-08-11 RX ADMIN — ASPIRIN 81 MG CHEWABLE TABLET 81 MG: 81 TABLET CHEWABLE at 07:57

## 2024-08-11 RX ADMIN — FAMOTIDINE 20 MG: 20 TABLET, FILM COATED ORAL at 07:55

## 2024-08-11 RX ADMIN — SACUBITRIL AND VALSARTAN 1 TABLET: 49; 51 TABLET, FILM COATED ORAL at 08:02

## 2024-08-11 RX ADMIN — CARVEDILOL 12.5 MG: 12.5 TABLET, FILM COATED ORAL at 17:07

## 2024-08-11 RX ADMIN — AMIODARONE HYDROCHLORIDE 200 MG: 200 TABLET ORAL at 08:01

## 2024-08-11 RX ADMIN — HEPARIN SODIUM 3000 UNITS: 1000 INJECTION INTRAVENOUS; SUBCUTANEOUS at 14:27

## 2024-08-11 RX ADMIN — APIXABAN 10 MG: 5 TABLET, FILM COATED ORAL at 20:54

## 2024-08-11 RX ADMIN — FUROSEMIDE 40 MG: 40 TABLET ORAL at 07:57

## 2024-08-11 RX ADMIN — SODIUM CHLORIDE, PRESERVATIVE FREE 10 ML: 5 INJECTION INTRAVENOUS at 08:02

## 2024-08-11 RX ADMIN — DOXYCYCLINE HYCLATE 100 MG: 100 CAPSULE ORAL at 20:54

## 2024-08-11 ASSESSMENT — PAIN SCALES - GENERAL
PAINLEVEL_OUTOF10: 2
PAINLEVEL_OUTOF10: 2
PAINLEVEL_OUTOF10: 0
PAINLEVEL_OUTOF10: 0

## 2024-08-12 ENCOUNTER — APPOINTMENT (OUTPATIENT)
Facility: HOSPITAL | Age: 54
DRG: 275 | End: 2024-08-12
Attending: INTERNAL MEDICINE
Payer: COMMERCIAL

## 2024-08-12 LAB
ALBUMIN SERPL-MCNC: 2.5 G/DL (ref 3.5–5)
ANION GAP SERPL CALC-SCNC: 7 MMOL/L (ref 5–15)
BUN SERPL-MCNC: 14 MG/DL (ref 6–20)
BUN/CREAT SERPL: 19 (ref 12–20)
CA-I BLD-MCNC: 9.2 MG/DL (ref 8.5–10.1)
CHLORIDE SERPL-SCNC: 107 MMOL/L (ref 97–108)
CO2 SERPL-SCNC: 25 MMOL/L (ref 21–32)
CREAT SERPL-MCNC: 0.73 MG/DL (ref 0.7–1.3)
GLUCOSE BLD STRIP.AUTO-MCNC: 108 MG/DL (ref 65–100)
GLUCOSE SERPL-MCNC: 99 MG/DL (ref 65–100)
MAGNESIUM SERPL-MCNC: NORMAL MG/DL (ref 1.6–2.4)
PERFORMED BY:: ABNORMAL
PHOSPHATE SERPL-MCNC: 3.8 MG/DL (ref 2.6–4.7)
POTASSIUM SERPL-SCNC: ABNORMAL MMOL/L (ref 3.5–5.1)
SODIUM SERPL-SCNC: 139 MMOL/L (ref 136–145)

## 2024-08-12 PROCEDURE — 6370000000 HC RX 637 (ALT 250 FOR IP): Performed by: INTERNAL MEDICINE

## 2024-08-12 PROCEDURE — 82962 GLUCOSE BLOOD TEST: CPT

## 2024-08-12 PROCEDURE — 36415 COLL VENOUS BLD VENIPUNCTURE: CPT

## 2024-08-12 PROCEDURE — 83735 ASSAY OF MAGNESIUM: CPT

## 2024-08-12 PROCEDURE — 2580000003 HC RX 258: Performed by: INTERNAL MEDICINE

## 2024-08-12 PROCEDURE — 2060000000 HC ICU INTERMEDIATE R&B

## 2024-08-12 PROCEDURE — 6360000002 HC RX W HCPCS: Performed by: INTERNAL MEDICINE

## 2024-08-12 PROCEDURE — 93306 TTE W/DOPPLER COMPLETE: CPT

## 2024-08-12 PROCEDURE — 80069 RENAL FUNCTION PANEL: CPT

## 2024-08-12 PROCEDURE — 6370000000 HC RX 637 (ALT 250 FOR IP): Performed by: HOSPITALIST

## 2024-08-12 PROCEDURE — 97530 THERAPEUTIC ACTIVITIES: CPT

## 2024-08-12 RX ADMIN — ACETAMINOPHEN 650 MG: 325 TABLET ORAL at 19:24

## 2024-08-12 RX ADMIN — APIXABAN 10 MG: 5 TABLET, FILM COATED ORAL at 20:36

## 2024-08-12 RX ADMIN — SPIRONOLACTONE 25 MG: 25 TABLET ORAL at 09:24

## 2024-08-12 RX ADMIN — THIAMINE HYDROCHLORIDE 100 MG: 100 INJECTION, SOLUTION INTRAMUSCULAR; INTRAVENOUS at 12:22

## 2024-08-12 RX ADMIN — DOXYCYCLINE HYCLATE 100 MG: 100 CAPSULE ORAL at 09:24

## 2024-08-12 RX ADMIN — CARVEDILOL 12.5 MG: 12.5 TABLET, FILM COATED ORAL at 17:42

## 2024-08-12 RX ADMIN — ATORVASTATIN CALCIUM 40 MG: 40 TABLET, FILM COATED ORAL at 20:36

## 2024-08-12 RX ADMIN — DOXYCYCLINE HYCLATE 100 MG: 100 CAPSULE ORAL at 20:35

## 2024-08-12 RX ADMIN — APIXABAN 10 MG: 5 TABLET, FILM COATED ORAL at 09:23

## 2024-08-12 RX ADMIN — CARVEDILOL 12.5 MG: 12.5 TABLET, FILM COATED ORAL at 09:23

## 2024-08-12 RX ADMIN — FAMOTIDINE 20 MG: 20 TABLET, FILM COATED ORAL at 09:24

## 2024-08-12 RX ADMIN — FAMOTIDINE 20 MG: 20 TABLET, FILM COATED ORAL at 20:53

## 2024-08-12 RX ADMIN — SACUBITRIL AND VALSARTAN 1 TABLET: 49; 51 TABLET, FILM COATED ORAL at 20:35

## 2024-08-12 RX ADMIN — AMIODARONE HYDROCHLORIDE 200 MG: 200 TABLET ORAL at 09:24

## 2024-08-12 RX ADMIN — SODIUM CHLORIDE, PRESERVATIVE FREE 10 ML: 5 INJECTION INTRAVENOUS at 20:53

## 2024-08-12 RX ADMIN — SODIUM CHLORIDE, PRESERVATIVE FREE 10 ML: 5 INJECTION INTRAVENOUS at 09:24

## 2024-08-12 RX ADMIN — ASPIRIN 81 MG CHEWABLE TABLET 81 MG: 81 TABLET CHEWABLE at 09:23

## 2024-08-12 RX ADMIN — SACUBITRIL AND VALSARTAN 1 TABLET: 49; 51 TABLET, FILM COATED ORAL at 09:24

## 2024-08-12 RX ADMIN — FUROSEMIDE 40 MG: 40 TABLET ORAL at 09:24

## 2024-08-12 ASSESSMENT — PAIN DESCRIPTION - DESCRIPTORS: DESCRIPTORS: ACHING

## 2024-08-12 ASSESSMENT — PAIN DESCRIPTION - ORIENTATION: ORIENTATION: MID

## 2024-08-12 ASSESSMENT — PAIN SCALES - GENERAL
PAINLEVEL_OUTOF10: 8
PAINLEVEL_OUTOF10: 0
PAINLEVEL_OUTOF10: 2
PAINLEVEL_OUTOF10: 0
PAINLEVEL_OUTOF10: 0

## 2024-08-12 ASSESSMENT — PAIN DESCRIPTION - LOCATION: LOCATION: CHEST

## 2024-08-12 NOTE — CARE COORDINATION
Current disposition is home/self. Patient has home health recommendations but has no PCP to follow HH orders as outpatient.

## 2024-08-13 LAB
ECHO AO ASC DIAM: 4.4 CM
ECHO AO ASCENDING AORTA INDEX: 2.39 CM/M2
ECHO AO ROOT DIAM: 3.8 CM
ECHO AO ROOT INDEX: 2.07 CM/M2
ECHO AR MAX VEL PISA: 4.6 M/S
ECHO AV AREA PEAK VELOCITY: 2.2 CM2
ECHO AV AREA/BSA PEAK VELOCITY: 1.2 CM2/M2
ECHO AV PEAK GRADIENT: 14 MMHG
ECHO AV PEAK VELOCITY: 1.9 M/S
ECHO AV REGURGITANT PHT: 597 MS
ECHO AV VELOCITY RATIO: 0.58
ECHO BSA: 1.92 M2
ECHO IVC EXP: 1.1 CM
ECHO LA AREA 2C: 19.2 CM2
ECHO LA AREA 4C: 19.7 CM2
ECHO LA DIAMETER INDEX: 1.74 CM/M2
ECHO LA DIAMETER: 3.2 CM
ECHO LA MAJOR AXIS: 6.3 CM
ECHO LA MINOR AXIS: 5.9 CM
ECHO LA TO AORTIC ROOT RATIO: 0.84
ECHO LA VOL BP: 53 ML (ref 18–58)
ECHO LA VOL MOD A2C: 52 ML (ref 18–58)
ECHO LA VOL MOD A4C: 51 ML (ref 18–58)
ECHO LA VOL/BSA BIPLANE: 29 ML/M2 (ref 16–34)
ECHO LA VOLUME INDEX MOD A2C: 28 ML/M2 (ref 16–34)
ECHO LA VOLUME INDEX MOD A4C: 28 ML/M2 (ref 16–34)
ECHO LV E' LATERAL VELOCITY: 7 CM/S
ECHO LV E' SEPTAL VELOCITY: 6 CM/S
ECHO LV EDV A4C: 138 ML
ECHO LV EDV INDEX A4C: 75 ML/M2
ECHO LV EJECTION FRACTION A4C: 23 %
ECHO LV ESV A4C: 106 ML
ECHO LV ESV INDEX A4C: 58 ML/M2
ECHO LV FRACTIONAL SHORTENING: 14 % (ref 28–44)
ECHO LV INTERNAL DIMENSION DIASTOLE INDEX: 3.1 CM/M2
ECHO LV INTERNAL DIMENSION DIASTOLIC: 5.7 CM (ref 4.2–5.9)
ECHO LV INTERNAL DIMENSION SYSTOLIC INDEX: 2.66 CM/M2
ECHO LV INTERNAL DIMENSION SYSTOLIC: 4.9 CM
ECHO LV IVSD: 1.4 CM (ref 0.6–1)
ECHO LV MASS 2D: 515.7 G (ref 88–224)
ECHO LV MASS INDEX 2D: 280.3 G/M2 (ref 49–115)
ECHO LV POSTERIOR WALL DIASTOLIC: 2.2 CM (ref 0.6–1)
ECHO LV RELATIVE WALL THICKNESS RATIO: 0.77
ECHO LVOT AREA: 3.8 CM2
ECHO LVOT DIAM: 2.2 CM
ECHO LVOT PEAK GRADIENT: 4 MMHG
ECHO LVOT PEAK VELOCITY: 1.1 M/S
ECHO MV A VELOCITY: 0.86 M/S
ECHO MV E DECELERATION TIME (DT): 177 MS
ECHO MV E VELOCITY: 0.68 M/S
ECHO MV E/A RATIO: 0.79
ECHO MV E/E' LATERAL: 9.71
ECHO MV E/E' RATIO (AVERAGED): 10.52
ECHO MV E/E' SEPTAL: 11.33
ECHO PV ACCELERATION TIME (AT): 119 MS
ECHO PV MAX VELOCITY: 0.9 M/S
ECHO PV PEAK GRADIENT: 3 MMHG
ECHO RA AREA 4C: 16.1 CM2
ECHO RA END SYSTOLIC VOLUME APICAL 4 CHAMBER INDEX BSA: 21 ML/M2
ECHO RA VOLUME: 38 ML
ECHO RV FREE WALL PEAK S': 15 CM/S
ECHO RV TAPSE: 2.2 CM (ref 1.7–?)
GLUCOSE BLD STRIP.AUTO-MCNC: 121 MG/DL (ref 65–100)
PERFORMED BY:: ABNORMAL

## 2024-08-13 PROCEDURE — 6370000000 HC RX 637 (ALT 250 FOR IP): Performed by: INTERNAL MEDICINE

## 2024-08-13 PROCEDURE — 2580000003 HC RX 258: Performed by: INTERNAL MEDICINE

## 2024-08-13 PROCEDURE — 6360000002 HC RX W HCPCS: Performed by: INTERNAL MEDICINE

## 2024-08-13 PROCEDURE — 97530 THERAPEUTIC ACTIVITIES: CPT

## 2024-08-13 PROCEDURE — 6370000000 HC RX 637 (ALT 250 FOR IP): Performed by: HOSPITALIST

## 2024-08-13 PROCEDURE — 82962 GLUCOSE BLOOD TEST: CPT

## 2024-08-13 PROCEDURE — 2060000000 HC ICU INTERMEDIATE R&B

## 2024-08-13 RX ADMIN — THIAMINE HYDROCHLORIDE 100 MG: 100 INJECTION, SOLUTION INTRAMUSCULAR; INTRAVENOUS at 08:06

## 2024-08-13 RX ADMIN — ACETAMINOPHEN 650 MG: 325 TABLET ORAL at 08:05

## 2024-08-13 RX ADMIN — DOXYCYCLINE HYCLATE 100 MG: 100 CAPSULE ORAL at 08:05

## 2024-08-13 RX ADMIN — SODIUM CHLORIDE, PRESERVATIVE FREE 10 ML: 5 INJECTION INTRAVENOUS at 08:07

## 2024-08-13 RX ADMIN — SACUBITRIL AND VALSARTAN 1 TABLET: 49; 51 TABLET, FILM COATED ORAL at 20:51

## 2024-08-13 RX ADMIN — AMIODARONE HYDROCHLORIDE 200 MG: 200 TABLET ORAL at 08:06

## 2024-08-13 RX ADMIN — ATORVASTATIN CALCIUM 40 MG: 40 TABLET, FILM COATED ORAL at 20:52

## 2024-08-13 RX ADMIN — FUROSEMIDE 40 MG: 40 TABLET ORAL at 08:05

## 2024-08-13 RX ADMIN — CARVEDILOL 12.5 MG: 12.5 TABLET, FILM COATED ORAL at 08:05

## 2024-08-13 RX ADMIN — FAMOTIDINE 20 MG: 20 TABLET, FILM COATED ORAL at 08:06

## 2024-08-13 RX ADMIN — APIXABAN 10 MG: 5 TABLET, FILM COATED ORAL at 08:05

## 2024-08-13 RX ADMIN — APIXABAN 10 MG: 5 TABLET, FILM COATED ORAL at 20:51

## 2024-08-13 RX ADMIN — ACETAMINOPHEN 650 MG: 325 TABLET ORAL at 20:54

## 2024-08-13 RX ADMIN — FAMOTIDINE 20 MG: 20 TABLET, FILM COATED ORAL at 20:52

## 2024-08-13 RX ADMIN — DOXYCYCLINE HYCLATE 100 MG: 100 CAPSULE ORAL at 20:52

## 2024-08-13 RX ADMIN — SODIUM CHLORIDE, PRESERVATIVE FREE 10 ML: 5 INJECTION INTRAVENOUS at 20:52

## 2024-08-13 RX ADMIN — ASPIRIN 81 MG CHEWABLE TABLET 81 MG: 81 TABLET CHEWABLE at 08:05

## 2024-08-13 RX ADMIN — SACUBITRIL AND VALSARTAN 1 TABLET: 49; 51 TABLET, FILM COATED ORAL at 08:05

## 2024-08-13 RX ADMIN — CARVEDILOL 12.5 MG: 12.5 TABLET, FILM COATED ORAL at 18:46

## 2024-08-13 RX ADMIN — SPIRONOLACTONE 25 MG: 25 TABLET ORAL at 08:05

## 2024-08-13 ASSESSMENT — PAIN DESCRIPTION - DESCRIPTORS: DESCRIPTORS: ACHING;SORE

## 2024-08-13 ASSESSMENT — PAIN SCALES - GENERAL
PAINLEVEL_OUTOF10: 1
PAINLEVEL_OUTOF10: 7

## 2024-08-13 ASSESSMENT — PAIN DESCRIPTION - ORIENTATION: ORIENTATION: MID

## 2024-08-13 ASSESSMENT — PAIN DESCRIPTION - LOCATION
LOCATION: CHEST
LOCATION: BACK

## 2024-08-14 VITALS
HEART RATE: 89 BPM | OXYGEN SATURATION: 100 % | WEIGHT: 164.46 LBS | HEIGHT: 66 IN | DIASTOLIC BLOOD PRESSURE: 75 MMHG | SYSTOLIC BLOOD PRESSURE: 106 MMHG | BODY MASS INDEX: 26.43 KG/M2 | TEMPERATURE: 98.1 F | RESPIRATION RATE: 18 BRPM

## 2024-08-14 LAB
ANION GAP SERPL CALC-SCNC: 5 MMOL/L (ref 5–15)
BUN SERPL-MCNC: 14 MG/DL (ref 6–20)
BUN/CREAT SERPL: 18 (ref 12–20)
CA-I BLD-MCNC: 9.7 MG/DL (ref 8.5–10.1)
CHLORIDE SERPL-SCNC: 106 MMOL/L (ref 97–108)
CO2 SERPL-SCNC: 26 MMOL/L (ref 21–32)
CREAT SERPL-MCNC: 0.76 MG/DL (ref 0.7–1.3)
ERYTHROCYTE [DISTWIDTH] IN BLOOD BY AUTOMATED COUNT: 13.7 % (ref 11.5–14.5)
GLUCOSE SERPL-MCNC: 110 MG/DL (ref 65–100)
HCT VFR BLD AUTO: 39.7 % (ref 36.6–50.3)
HGB BLD-MCNC: 13.1 G/DL (ref 12.1–17)
MCH RBC QN AUTO: 28.6 PG (ref 26–34)
MCHC RBC AUTO-ENTMCNC: 33 G/DL (ref 30–36.5)
MCV RBC AUTO: 86.7 FL (ref 80–99)
NRBC # BLD: 0 K/UL (ref 0–0.01)
NRBC BLD-RTO: 0 PER 100 WBC
PLATELET # BLD AUTO: 320 K/UL (ref 150–400)
PMV BLD AUTO: 9.8 FL (ref 8.9–12.9)
POTASSIUM SERPL-SCNC: 3.9 MMOL/L (ref 3.5–5.1)
RBC # BLD AUTO: 4.58 M/UL (ref 4.1–5.7)
SODIUM SERPL-SCNC: 137 MMOL/L (ref 136–145)
WBC # BLD AUTO: 13.2 K/UL (ref 4.1–11.1)

## 2024-08-14 PROCEDURE — 6370000000 HC RX 637 (ALT 250 FOR IP): Performed by: INTERNAL MEDICINE

## 2024-08-14 PROCEDURE — 6370000000 HC RX 637 (ALT 250 FOR IP): Performed by: HOSPITALIST

## 2024-08-14 PROCEDURE — 36415 COLL VENOUS BLD VENIPUNCTURE: CPT

## 2024-08-14 PROCEDURE — 6360000002 HC RX W HCPCS: Performed by: INTERNAL MEDICINE

## 2024-08-14 PROCEDURE — 80048 BASIC METABOLIC PNL TOTAL CA: CPT

## 2024-08-14 PROCEDURE — 85027 COMPLETE CBC AUTOMATED: CPT

## 2024-08-14 PROCEDURE — 2580000003 HC RX 258: Performed by: INTERNAL MEDICINE

## 2024-08-14 RX ORDER — CARVEDILOL 12.5 MG/1
12.5 TABLET ORAL 2 TIMES DAILY WITH MEALS
Qty: 60 TABLET | Refills: 3 | Status: SHIPPED | OUTPATIENT
Start: 2024-08-14

## 2024-08-14 RX ORDER — DOXYCYCLINE HYCLATE 100 MG/1
100 CAPSULE ORAL EVERY 12 HOURS SCHEDULED
Qty: 20 CAPSULE | Refills: 0 | Status: SHIPPED | OUTPATIENT
Start: 2024-08-14 | End: 2024-08-24

## 2024-08-14 RX ORDER — FUROSEMIDE 40 MG/1
40 TABLET ORAL DAILY
Qty: 60 TABLET | Refills: 3 | Status: SHIPPED | OUTPATIENT
Start: 2024-08-15

## 2024-08-14 RX ORDER — THIAMINE MONONITRATE (VIT B1) 100 MG
100 TABLET ORAL DAILY
Qty: 30 TABLET | Refills: 3 | Status: SHIPPED | OUTPATIENT
Start: 2024-08-14

## 2024-08-14 RX ORDER — ASPIRIN 81 MG/1
81 TABLET, CHEWABLE ORAL DAILY
Qty: 30 TABLET | Refills: 3 | Status: SHIPPED | OUTPATIENT
Start: 2024-08-15

## 2024-08-14 RX ORDER — SPIRONOLACTONE 25 MG/1
25 TABLET ORAL DAILY
Qty: 30 TABLET | Refills: 3 | Status: SHIPPED | OUTPATIENT
Start: 2024-08-15

## 2024-08-14 RX ORDER — FAMOTIDINE 20 MG/1
20 TABLET, FILM COATED ORAL 2 TIMES DAILY
Qty: 60 TABLET | Refills: 3 | Status: SHIPPED | OUTPATIENT
Start: 2024-08-14

## 2024-08-14 RX ORDER — AMIODARONE HYDROCHLORIDE 200 MG/1
200 TABLET ORAL DAILY
Qty: 30 TABLET | Refills: 0 | Status: SHIPPED | OUTPATIENT
Start: 2024-08-15

## 2024-08-14 RX ORDER — ATORVASTATIN CALCIUM 40 MG/1
40 TABLET, FILM COATED ORAL NIGHTLY
Qty: 30 TABLET | Refills: 3 | Status: SHIPPED | OUTPATIENT
Start: 2024-08-14

## 2024-08-14 RX ADMIN — CARVEDILOL 12.5 MG: 12.5 TABLET, FILM COATED ORAL at 09:29

## 2024-08-14 RX ADMIN — SACUBITRIL AND VALSARTAN 1 TABLET: 49; 51 TABLET, FILM COATED ORAL at 09:29

## 2024-08-14 RX ADMIN — SODIUM CHLORIDE, PRESERVATIVE FREE 10 ML: 5 INJECTION INTRAVENOUS at 09:29

## 2024-08-14 RX ADMIN — SPIRONOLACTONE 25 MG: 25 TABLET ORAL at 09:27

## 2024-08-14 RX ADMIN — APIXABAN 10 MG: 5 TABLET, FILM COATED ORAL at 09:29

## 2024-08-14 RX ADMIN — ACETAMINOPHEN 650 MG: 325 TABLET ORAL at 04:03

## 2024-08-14 RX ADMIN — THIAMINE HYDROCHLORIDE 100 MG: 100 INJECTION, SOLUTION INTRAMUSCULAR; INTRAVENOUS at 09:36

## 2024-08-14 RX ADMIN — DOXYCYCLINE HYCLATE 100 MG: 100 CAPSULE ORAL at 09:28

## 2024-08-14 RX ADMIN — FAMOTIDINE 20 MG: 20 TABLET, FILM COATED ORAL at 09:29

## 2024-08-14 RX ADMIN — AMIODARONE HYDROCHLORIDE 200 MG: 200 TABLET ORAL at 09:28

## 2024-08-14 RX ADMIN — ASPIRIN 81 MG CHEWABLE TABLET 81 MG: 81 TABLET CHEWABLE at 09:29

## 2024-08-14 RX ADMIN — FUROSEMIDE 40 MG: 40 TABLET ORAL at 09:27

## 2024-08-14 RX ADMIN — LIDOCAINE: 50 OINTMENT TOPICAL at 09:31

## 2024-08-14 ASSESSMENT — PAIN DESCRIPTION - LOCATION: LOCATION: CHEST

## 2024-08-14 ASSESSMENT — PAIN SCALES - GENERAL
PAINLEVEL_OUTOF10: 0
PAINLEVEL_OUTOF10: 4

## 2024-08-14 ASSESSMENT — PAIN DESCRIPTION - DESCRIPTORS: DESCRIPTORS: ACHING

## 2024-08-14 ASSESSMENT — PAIN DESCRIPTION - ORIENTATION: ORIENTATION: LEFT;UPPER

## 2024-08-14 ASSESSMENT — PAIN - FUNCTIONAL ASSESSMENT: PAIN_FUNCTIONAL_ASSESSMENT: ACTIVITIES ARE NOT PREVENTED

## 2024-08-14 NOTE — PLAN OF CARE
PHYSICAL THERAPY TREATMENT     Patient: CAROLINA CESPEDES (53 y.o. adult)  Date: 8/12/2024  Diagnosis: Tachycardia [R00.0]  STEMI (ST elevation myocardial infarction) (HCC) [I21.3]  Cardiac arrest with successful resuscitation (HCC) [I46.9]  Cardiac arrest with ventricular fibrillation (HCC) [I46.9, I49.01] Cardiac arrest with ventricular fibrillation (HCC)  Procedure(s) (LRB):  Insert ICD dual (N/A)  Venogram upper ext left (N/A) 5 Days Post-Op  Precautions: Fall Risk, Other (comment) (Pacemaker Precautions)                      Recommendations for nursing mobility: Out of bed to chair for meals, Encourage HEP in prep for ADLs/mobility; see handout for details, Frequent repositioning to prevent skin breakdown, and Assist x1    In place during session: EKG/telemetry   Chart, physical therapy assessment, plan of care and goals were reviewed.  ASSESSMENT  Patient continues with skilled PT services and is progressing towards goals. Pt sitting up in bedside chair upon PT arrival, agreeable to session. (See below for objective details and assist levels).     Overall pt tolerated session well today. Pt educated on pacemaker precautions, pt verbalized understanding. Pt stood from bed and ambulated ~200 ft with no AD. Gait was slow and steady with no lob or knee buckling noted. Pt asked about stair training. Pt reports having stairs in her home up to a second floor but they are creating a first floor bedroom for her so she doesn't have to do them. Educated pt on how to perform stairs and plan to trial some with her next tx session. Pt left sitting in bedside chair with all needs met. Will continue to benefit from skilled PT services, and will continue to progress as tolerated. Potential barriers for safe discharge:. Current PT DC recommendation Home with Home Health Therapy once medically appropriate.    GOALS:  Problem: Physical Therapy - Adult  Goal: By Discharge: Performs mobility at highest level of function for 
         PHYSICAL THERAPY TREATMENT     Patient: CAROLINA CESPEDES (53 y.o. adult)  Date: 8/13/2024  Diagnosis: Tachycardia [R00.0]  STEMI (ST elevation myocardial infarction) (HCC) [I21.3]  Cardiac arrest with successful resuscitation (HCC) [I46.9]  Cardiac arrest with ventricular fibrillation (HCC) [I46.9, I49.01] Cardiac arrest with ventricular fibrillation (HCC)  Procedure(s) (LRB):  Insert ICD dual (N/A)  Venogram upper ext left (N/A) 6 Days Post-Op  Precautions: Fall Risk, Other (comment) (Pacemaker Precautions)                      Recommendations for nursing mobility: Out of bed to chair for meals, Amb in hallway, and Assist x1    In place during session: EKG/telemetry   Chart, physical therapy assessment, plan of care and goals were reviewed.  ASSESSMENT  Patient continues with skilled PT services and is progressing towards goals. Pt sittin gin recliner chair upon PT arrival, agreeable to session. (See below for objective details and assist levels).     Overall pt tolerated session well today. Pt stood from chair and ambulated ~250 ft with no AD. Gait was slow and steady with no lob or knee buckling noted. Pt performed 5 stairs (ascending and descending) with cga. Family present for education on stair training. Reviewed pacemaker precautions with pt, pt verbalized understanding. Pt educated on LE therex for increased LE strength, pt performed a few reps and was given HEP handout. Pt left sitting up in recliner chair with all needs met. Will continue to benefit from skilled PT services, and will continue to progress as tolerated. Potential barriers for safe discharge:. Current PT DC recommendation Home with Home Health Therapy and family assist once medically appropriate.    GOALS:  Problem: Physical Therapy - Adult  Goal: By Discharge: Performs mobility at highest level of function for planned discharge setting.  See evaluation for individualized goals.  Description: FUNCTIONAL STATUS PRIOR TO ADMISSION: 
  Problem: Pain  Goal: Verbalizes/displays adequate comfort level or baseline comfort level  8/10/2024 1052 by Sisi Mckeon RN  Outcome: Progressing  8/10/2024 0005 by Diana Hernández RN  Outcome: Progressing     Problem: Discharge Planning  Goal: Discharge to home or other facility with appropriate resources  8/10/2024 1052 by Sisi Mckeon RN  Outcome: Progressing  Flowsheets (Taken 8/10/2024 0746)  Discharge to home or other facility with appropriate resources: Identify barriers to discharge with patient and caregiver  8/10/2024 0005 by Diana Hernández RN  Outcome: Progressing     Problem: Safety - Adult  Goal: Free from fall injury  8/10/2024 1052 by Sisi Mckeon RN  Outcome: Progressing  8/10/2024 0005 by Diana Hernández RN  Outcome: Progressing     Problem: Skin/Tissue Integrity  Goal: Absence of new skin breakdown  Description: 1.  Monitor for areas of redness and/or skin breakdown  2.  Assess vascular access sites hourly  3.  Every 4-6 hours minimum:  Change oxygen saturation probe site  4.  Every 4-6 hours:  If on nasal continuous positive airway pressure, respiratory therapy assess nares and determine need for appliance change or resting period.  8/10/2024 1052 by Sisi Mckeon RN  Outcome: Progressing  8/10/2024 0005 by Diana Hernández RN  Outcome: Progressing     Problem: Neurosensory - Adult  Goal: Achieves stable or improved neurological status  Recent Flowsheet Documentation  Taken 8/10/2024 0746 by Sisi Mckeon RN  Achieves stable or improved neurological status: Monitor temperature, glucose, and sodium. Initiate appropriate interventions as ordered  8/10/2024 0005 by Diana Hernández RN  Outcome: Progressing  Goal: Achieves maximal functionality and self care  8/10/2024 0005 by Diana Hernández RN  Outcome: Progressing     Problem: Respiratory - Adult  Goal: Achieves optimal ventilation and oxygenation  8/10/2024 1052 by Sisi Mckeon RN  Outcome: Progressing  Flowsheets 
  Problem: Pain  Goal: Verbalizes/displays adequate comfort level or baseline comfort level  8/11/2024 2000 by Garrett Hall RN  Outcome: Progressing  8/11/2024 1534 by Sisi Mckeon RN  Outcome: Progressing     Problem: Discharge Planning  Goal: Discharge to home or other facility with appropriate resources  Outcome: Progressing  Flowsheets (Taken 8/11/2024 0801 by Sisi Mckeon RN)  Discharge to home or other facility with appropriate resources: Identify barriers to discharge with patient and caregiver     Problem: Safety - Adult  Goal: Free from fall injury  8/11/2024 2000 by Garrett Hall RN  Outcome: Progressing  8/11/2024 1534 by Sisi Mckeon RN  Outcome: Progressing     Problem: Skin/Tissue Integrity  Goal: Absence of new skin breakdown  Description: 1.  Monitor for areas of redness and/or skin breakdown  2.  Assess vascular access sites hourly  3.  Every 4-6 hours minimum:  Change oxygen saturation probe site  4.  Every 4-6 hours:  If on nasal continuous positive airway pressure, respiratory therapy assess nares and determine need for appliance change or resting period.  8/11/2024 2000 by Garrett Hall RN  Outcome: Progressing  8/11/2024 1534 by Sisi Mckeon RN  Outcome: Progressing     Problem: Neurosensory - Adult  Goal: Achieves stable or improved neurological status  8/11/2024 2000 by Garrett Hall RN  Outcome: Progressing  8/11/2024 1534 by Sisi Mckeon RN  Outcome: Progressing  Goal: Achieves maximal functionality and self care  8/11/2024 2000 by Garrett Hall RN  Outcome: Progressing  8/11/2024 1534 by Sisi Mckeon RN  Outcome: Progressing     Problem: Respiratory - Adult  Goal: Achieves optimal ventilation and oxygenation  Outcome: Progressing  Flowsheets (Taken 8/11/2024 0801 by Sisi Mckeon RN)  Achieves optimal ventilation and oxygenation: Assess for changes in respiratory status     Problem: Cardiovascular - Adult  Goal: Maintains optimal cardiac output and hemodynamic 
  Problem: Pain  Goal: Verbalizes/displays adequate comfort level or baseline comfort level  8/12/2024 2154 by Garrett Hall RN  Outcome: Progressing  8/12/2024 1129 by Sisi Mckeon RN  Outcome: Progressing     Problem: Discharge Planning  Goal: Discharge to home or other facility with appropriate resources  8/12/2024 2154 by Garrett Hall RN  Outcome: Progressing  8/12/2024 1129 by Sisi Mckeon RN  Outcome: Progressing     Problem: Safety - Adult  Goal: Free from fall injury  8/12/2024 2154 by Garrett Hall RN  Outcome: Progressing  8/12/2024 1129 by Sisi Mckeon RN  Outcome: Progressing     Problem: Skin/Tissue Integrity  Goal: Absence of new skin breakdown  Description: 1.  Monitor for areas of redness and/or skin breakdown  2.  Assess vascular access sites hourly  3.  Every 4-6 hours minimum:  Change oxygen saturation probe site  4.  Every 4-6 hours:  If on nasal continuous positive airway pressure, respiratory therapy assess nares and determine need for appliance change or resting period.  8/12/2024 2154 by Garrett Hall RN  Outcome: Progressing  8/12/2024 1129 by Sisi Mckeon RN  Outcome: Progressing     Problem: Neurosensory - Adult  Goal: Achieves stable or improved neurological status  8/12/2024 2154 by Garrett Hall RN  Outcome: Progressing  Flowsheets (Taken 8/12/2024 2000)  Achieves stable or improved neurological status:   Assess for and report changes in neurological status   Initiate measures to prevent increased intracranial pressure  8/12/2024 1129 by Sisi Mckeon RN  Outcome: Progressing  Flowsheets (Taken 8/12/2024 0920)  Achieves stable or improved neurological status: Assess for and report changes in neurological status  Goal: Achieves maximal functionality and self care  8/12/2024 2154 by Garrett Hall RN  Outcome: Progressing  Flowsheets (Taken 8/12/2024 2000)  Achieves maximal functionality and self care:   Monitor swallowing and airway patency with patient fatigue and changes in 
  Problem: Pain  Goal: Verbalizes/displays adequate comfort level or baseline comfort level  Outcome: Progressing     Problem: Discharge Planning  Goal: Discharge to home or other facility with appropriate resources  Outcome: Progressing     Problem: Safety - Adult  Goal: Free from fall injury  Outcome: Progressing     Problem: Skin/Tissue Integrity  Goal: Absence of new skin breakdown  Description: 1.  Monitor for areas of redness and/or skin breakdown  2.  Assess vascular access sites hourly  3.  Every 4-6 hours minimum:  Change oxygen saturation probe site  4.  Every 4-6 hours:  If on nasal continuous positive airway pressure, respiratory therapy assess nares and determine need for appliance change or resting period.  Outcome: Progressing     Problem: Neurosensory - Adult  Goal: Achieves stable or improved neurological status  Outcome: Progressing  Flowsheets (Taken 8/12/2024 0920)  Achieves stable or improved neurological status: Assess for and report changes in neurological status  Goal: Achieves maximal functionality and self care  Outcome: Progressing     Problem: Respiratory - Adult  Goal: Achieves optimal ventilation and oxygenation  Outcome: Progressing  Flowsheets (Taken 8/12/2024 0920)  Achieves optimal ventilation and oxygenation: Assess for changes in respiratory status     Problem: Cardiovascular - Adult  Goal: Maintains optimal cardiac output and hemodynamic stability  Outcome: Progressing  Goal: Absence of cardiac dysrhythmias or at baseline  Outcome: Progressing     Problem: Musculoskeletal - Adult  Goal: Return mobility to safest level of function  Outcome: Progressing  Goal: Return ADL status to a safe level of function  Outcome: Progressing     Problem: Gastrointestinal - Adult  Goal: Minimal or absence of nausea and vomiting  Outcome: Progressing  Goal: Maintains or returns to baseline bowel function  Outcome: Progressing  Goal: Maintains adequate nutritional intake  Outcome: Progressing   
  Problem: Pain  Goal: Verbalizes/displays adequate comfort level or baseline comfort level  Outcome: Progressing     Problem: Discharge Planning  Goal: Discharge to home or other facility with appropriate resources  Outcome: Progressing     Problem: Safety - Adult  Goal: Free from fall injury  Outcome: Progressing     Problem: Skin/Tissue Integrity  Goal: Absence of new skin breakdown  Description: 1.  Monitor for areas of redness and/or skin breakdown  2.  Assess vascular access sites hourly  3.  Every 4-6 hours minimum:  Change oxygen saturation probe site  4.  Every 4-6 hours:  If on nasal continuous positive airway pressure, respiratory therapy assess nares and determine need for appliance change or resting period.  Outcome: Progressing     Problem: Neurosensory - Adult  Goal: Achieves stable or improved neurological status  Outcome: Progressing  Goal: Achieves maximal functionality and self care  Outcome: Progressing     Problem: Respiratory - Adult  Goal: Achieves optimal ventilation and oxygenation  Outcome: Progressing     Problem: Cardiovascular - Adult  Goal: Maintains optimal cardiac output and hemodynamic stability  Outcome: Progressing  Goal: Absence of cardiac dysrhythmias or at baseline  Outcome: Progressing     Problem: Musculoskeletal - Adult  Goal: Return mobility to safest level of function  Outcome: Progressing     Problem: Gastrointestinal - Adult  Goal: Minimal or absence of nausea and vomiting  Outcome: Progressing  Goal: Maintains or returns to baseline bowel function  Outcome: Progressing  Goal: Maintains adequate nutritional intake  Outcome: Progressing     Problem: Genitourinary - Adult  Goal: Absence of urinary retention  Outcome: Progressing     Problem: Chronic Conditions and Co-morbidities  Goal: Patient's chronic conditions and co-morbidity symptoms are monitored and maintained or improved  Outcome: Progressing     
  Problem: Pain  Goal: Verbalizes/displays adequate comfort level or baseline comfort level  Outcome: Progressing     Problem: Safety - Adult  Goal: Free from fall injury  Outcome: Progressing     Problem: Skin/Tissue Integrity  Goal: Absence of new skin breakdown  Description: 1.  Monitor for areas of redness and/or skin breakdown  2.  Assess vascular access sites hourly  3.  Every 4-6 hours minimum:  Change oxygen saturation probe site  4.  Every 4-6 hours:  If on nasal continuous positive airway pressure, respiratory therapy assess nares and determine need for appliance change or resting period.  Outcome: Progressing     Problem: Neurosensory - Adult  Goal: Achieves stable or improved neurological status  Outcome: Progressing  Goal: Achieves maximal functionality and self care  Outcome: Progressing     Problem: Cardiovascular - Adult  Goal: Maintains optimal cardiac output and hemodynamic stability  Outcome: Progressing     Problem: Musculoskeletal - Adult  Goal: Return mobility to safest level of function  Outcome: Progressing  Goal: Return ADL status to a safe level of function  Outcome: Progressing     Problem: Gastrointestinal - Adult  Goal: Minimal or absence of nausea and vomiting  Outcome: Progressing  Goal: Maintains or returns to baseline bowel function  Outcome: Progressing  Goal: Maintains adequate nutritional intake  Outcome: Progressing     Problem: Genitourinary - Adult  Goal: Absence of urinary retention  Outcome: Progressing     Problem: Coping  Goal: Pt/Family able to verbalize concerns and demonstrate effective coping strategies  Description: INTERVENTIONS:  1. Assist patient/family to identify coping skills, available support systems and cultural and spiritual values  2. Provide emotional support, including active listening and acknowledgement of concerns of patient and caregivers  3. Reduce environmental stimuli, as able  4. Instruct patient/family in relaxation techniques, as appropriate  5. 
  Problem: Pain  Goal: Verbalizes/displays adequate comfort level or baseline comfort level  Outcome: Progressing  Flowsheets (Taken 8/13/2024 1900)  Verbalizes/displays adequate comfort level or baseline comfort level:   Encourage patient to monitor pain and request assistance   Assess pain using appropriate pain scale     Problem: Discharge Planning  Goal: Discharge to home or other facility with appropriate resources  Outcome: Progressing  Flowsheets (Taken 8/13/2024 1920)  Discharge to home or other facility with appropriate resources:   Identify barriers to discharge with patient and caregiver   Arrange for needed discharge resources and transportation as appropriate     Problem: Safety - Adult  Goal: Free from fall injury  Outcome: Progressing     Problem: Skin/Tissue Integrity  Goal: Absence of new skin breakdown  Description: 1.  Monitor for areas of redness and/or skin breakdown  2.  Assess vascular access sites hourly  3.  Every 4-6 hours minimum:  Change oxygen saturation probe site  4.  Every 4-6 hours:  If on nasal continuous positive airway pressure, respiratory therapy assess nares and determine need for appliance change or resting period.  Outcome: Progressing     Problem: Neurosensory - Adult  Goal: Achieves stable or improved neurological status  Outcome: Progressing  Goal: Achieves maximal functionality and self care  Outcome: Progressing     Problem: Respiratory - Adult  Goal: Achieves optimal ventilation and oxygenation  Outcome: Progressing     Problem: Cardiovascular - Adult  Goal: Maintains optimal cardiac output and hemodynamic stability  Outcome: Progressing  Goal: Absence of cardiac dysrhythmias or at baseline  Outcome: Progressing     Problem: Musculoskeletal - Adult  Goal: Return mobility to safest level of function  Outcome: Progressing  Goal: Return ADL status to a safe level of function  Outcome: Progressing     Problem: Gastrointestinal - Adult  Goal: Minimal or absence of nausea and 
  Problem: Pain  Goal: Verbalizes/displays adequate comfort level or baseline comfort level  Outcome: Progressing  Flowsheets (Taken 8/8/2024 1930)  Verbalizes/displays adequate comfort level or baseline comfort level: Encourage patient to monitor pain and request assistance     Problem: Discharge Planning  Goal: Discharge to home or other facility with appropriate resources  Outcome: Progressing  Flowsheets (Taken 8/8/2024 1930)  Discharge to home or other facility with appropriate resources:   Identify barriers to discharge with patient and caregiver   Identify discharge learning needs (meds, wound care, etc)     Problem: Safety - Adult  Goal: Free from fall injury  Outcome: Progressing  Flowsheets (Taken 8/8/2024 1930)  Free From Fall Injury: Instruct family/caregiver on patient safety     Problem: Skin/Tissue Integrity  Goal: Absence of new skin breakdown  Description: 1.  Monitor for areas of redness and/or skin breakdown  2.  Assess vascular access sites hourly  3.  Every 4-6 hours minimum:  Change oxygen saturation probe site  4.  Every 4-6 hours:  If on nasal continuous positive airway pressure, respiratory therapy assess nares and determine need for appliance change or resting period.  Outcome: Progressing     Problem: Neurosensory - Adult  Goal: Achieves stable or improved neurological status  Outcome: Progressing  Flowsheets (Taken 8/8/2024 1930)  Achieves stable or improved neurological status: Assess for and report changes in neurological status  Goal: Achieves maximal functionality and self care  Outcome: Progressing  Flowsheets (Taken 8/8/2024 1930)  Achieves maximal functionality and self care: Monitor swallowing and airway patency with patient fatigue and changes in neurological status     Problem: Respiratory - Adult  Goal: Achieves optimal ventilation and oxygenation  Outcome: Progressing  Flowsheets (Taken 8/8/2024 1930)  Achieves optimal ventilation and oxygenation:   Assess for changes in 
PHYSICAL THERAPY EVALUATION  Patient: CAROLINA CESPEDES (53 y.o. adult)  Date: 8/9/2024  Primary Diagnosis: Tachycardia [R00.0]  STEMI (ST elevation myocardial infarction) (HCC) [I21.3]  Cardiac arrest with successful resuscitation (HCC) [I46.9]  Cardiac arrest with ventricular fibrillation (HCC) [I46.9, I49.01]  Procedure(s) (LRB):  Insert ICD dual (N/A)  Venogram upper ext left (N/A) 2 Days Post-Op   Precautions: Fall Risk, Other (comment) (Pacemaker Precautions)                      Recommendations for nursing mobility: Out of bed to chair for meals, Encourage HEP in prep for ADLs/mobility; see handout for details, AD and gt belt for bed to chair , Amb to bathroom with AD and gait belt, Assist x1, and Pacemaker precautions; no pushing, pulling, or lifting UE above shoulder height    In place during session: Peripheral IV, External Catheter, EKG/telemetry , and Pulse ox    ASSESSMENT  Pt is a 53 y.o. adult admitted on 8/4/2024 for cardiac arrest; pt currently being treated for s/p vfib/vtach cardiac arrest, acute respiratory failure, STEMI, likely due to undiagnosed cardiomyopathy.  Pt received 4 defibrillations with cardioversion. Pt semi-supine upon PT arrival, agreeable to evaluation. Pt A&O x 4.  Pt reports no falls in the past 3 months.    Based on the objective data described below, the patient currently presents with impaired functional mobility, impaired strength, decreased activity tolerance, poor safety awareness, and impaired balance. (See below for objective details and assist levels).     Overall pt tolerated session well today with 7-8/10. Pt required SBA for bed mobility and CGA for transfers. Pt amb 25 feet with no AD, gt belt and CGA x 2; demonstrates slow, guarded johnie with decreased step length and decreased step clearance.  Pt slightly unsteady with no major LOB.  Pt reports feeling slightly lightheaded with sitting and standing, but BP remained stable.  Pt may benefit from the use of a walker 
PT attempted skilled therapy session however patient resting comfortably in bed with LUE propped on pillow after IV insertion and politely declined working with therapy to rest. Patient and nurse report that patient has been up ambulating around room today. Educated patient and significant other on home setup and safety for return home. Will continue to follow patient and attempt skilled therapy session at a later date and/or time as appropriate.   
symptoms are monitored and maintained or improved  Outcome: Progressing

## 2024-08-14 NOTE — PROGRESS NOTES
Progress Note      8/10/2024 9:43 AM  NAME: CAROLINA CESPEDES   MRN:  [id number]   Admit Diagnosis: Tachycardia [R00.0]  STEMI (ST elevation myocardial infarction) (HCC) [I21.3]  Cardiac arrest with successful resuscitation (HCC) [I46.9]  Cardiac arrest with ventricular fibrillation (HCC) [I46.9, I49.01]      Problem List:   -Admitted to the hospital with cardiac arrest  -Nonischemic cardiomyopathy with ejection fraction of 25%  -Status post AICD implantation.         Assessment/Plan:   Note dictated 8/10/2024 -covering Dr. ROXANNE Salas  -Follow-up visit from cardiology as patient is still in the hospital.  She is status post cardiac arrest successfully resuscitated status post cardiac catheterization with nonobstructive coronary artery disease in RCA up to 60%.  -Diagnosed with dilated cardiomyopathy with ejection fraction of 25% and AICD was implantation as a secondary prevention.  -Patient is lying comfortably in the bed and denies any symptoms other than chest pain the reason for me to seeing the patient today.  -Continue current conservative medical management and her chest pain is probably secondary to chest wall pain following chest compression during the CPR and resuscitation.  -Continue current conservative medical management with no further cardiovascular testing.  -AICD implantation site looks okay  -We will continue to follow while patient is in the hospital along with the team and make further cardiovascular management decision including but not limited to optimizing guideline directed medical management for cardiomyopathy and increased dose as tolerated hemodynamically.               []       High complexity decision making was performed in this patient at high risk for decompensation with multiple organ involvement.    Subjective:     CAROLINA CESPEDES is a 53-year-old white female with history of recent cardiac arrest status post AICD implantation found to have nonischemic cardiomyopathy and receiving 
        Progress Note      8/12/2024 1:22 PM  NAME: CAROLINA CESPEDES   MRN:[id number]   Admit Diagnosis: Tachycardia [R00.0]  STEMI (ST elevation myocardial infarction) ([facility]) [I21.3]  Cardiac arrest with successful resuscitation ([facility]) [I46.9]  Cardiac arrest with ventricular fibrillation ([facility]) [I46.9, I49.01]      Subjective:   Patient had a cardiac catheterization performed and did not reveal critical coronary stenosis.  Mid right coronary artery is about 60% stenosis but is a very large vessel and this is a nonobstructive coronary disease.    She also got her echocardiogram on cath table and shows the left ventricle is dilated ejection fraction about 25% with a moderate aortic regurgitation.  08/12/2024  I discussed this with the patient's family members.  Patient is extubated and says he feels much better though she has some soreness of the chest wall where patient was shocked and had a CPR.  Patient received ICD.  Blood pressure still somewhat labile.  Memory seems to be improving.  Patient ambulated well in the corridor door.    Review of Systems:    Symptom Y/N Comments  Symptom Y/N Comments   Fever/Chills n   Chest Pain y    Poor Appetite    Edema     Cough    Abdominal Pain     Sputum    Joint Pain     SOB/DELEON n   Pruritis/Rash     Nausea/vomit    Other y Memory lapse   Diarrhea         Constipation           Could NOT obtain due to:      Objective:          Physical Exam:    Last 24hrs VS reviewed since prior progress note. Most recent are:    /81   Pulse 89   Temp 98.1 °F (36.7 °C) (Oral)   Resp 18   Ht 1.676 m (5' 6\")   Wt 74.6 kg (164 lb 7.4 oz)   SpO2 99%   BMI 26.55 kg/m²     Intake/Output Summary (Last 24 hours) at 8/12/2024 1322  Last data filed at 8/12/2024 1127  Gross per 24 hour   Intake 1140 ml   Output 150 ml   Net 990 ml        General Appearance: Well developed, well nourished, alert and oriented   no acute distress.  Ears/Nose/Mouth/Throat: Hearing grossly normal.  Neck: 
        Progress Note      8/4/2024 3:39 PM  NAME: Loy SolomonDundas   MRN:[id number]   Admit Diagnosis: Tachycardia [R00.0]  STEMI (ST elevation myocardial infarction) ([facility]) [I21.3]  Cardiac arrest with successful resuscitation ([facility]) [I46.9]      Subjective:   Patient had a cardiac catheterization performed and did not reveal critical coronary stenosis.  Mid right coronary artery is about 60% stenosis but is a very large vessel and this is a nonobstructive coronary disease.    She also got her echocardiogram on cath table and shows the left ventricle is dilated ejection fraction about 25% with a moderate aortic regurgitation.    I discussed this with the patient's family members and with the hospitalist.  Also discussed with the electrophysiologist Dr. Gimenez.    Review of Systems:    Symptom Y/N Comments  Symptom Y/N Comments   Fever/Chills    Chest Pain     Poor Appetite    Edema     Cough    Abdominal Pain     Sputum    Joint Pain     SOB/DELEON    Pruritis/Rash     Nausea/vomit    Other     Diarrhea         Constipation           Could NOT obtain due to: Patient is on ventilator.     Objective:          Physical Exam:    Last 24hrs VS reviewed since prior progress note. Most recent are:    BP (!) 217/126   Pulse (!) 124   Resp 14   Ht 1.676 m (5' 6\")   Wt 79.4 kg (175 lb)   SpO2 97%   BMI 28.25 kg/m²     Intake/Output Summary (Last 24 hours) at 8/4/2024 1539  Last data filed at 8/4/2024 1509  Gross per 24 hour   Intake --   Output 30 ml   Net -30 ml        General Appearance: Well developed, well nourished, sedated and intubated   no acute distress.  Ears/Nose/Mouth/Throat: Hearing grossly normal.  Neck: Supple.  Chest: Patient is on ventilator support  Cardiovascular: Regular rate and rhythm, S1,S2 normal, no murmur.  Abdomen: Soft, non-tender, bowel sounds are active.  Extremities: No edema bilaterally.  Skin: Warm and dry.    []         Post-cath site without hematoma, bruit, tenderness, or thrill.  
        Progress Note      8/5/2024 12:37 PM  NAME: CAROLINA CESPEDES   MRN:[id number]   Admit Diagnosis: Tachycardia [R00.0]  STEMI (ST elevation myocardial infarction) ([facility]) [I21.3]  Cardiac arrest with successful resuscitation ([facility]) [I46.9]  Cardiac arrest with ventricular fibrillation ([facility]) [I46.9, I49.01]      Subjective:   Patient had a cardiac catheterization performed and did not reveal critical coronary stenosis.  Mid right coronary artery is about 60% stenosis but is a very large vessel and this is a nonobstructive coronary disease.    She also got her echocardiogram on cath table and shows the left ventricle is dilated ejection fraction about 25% with a moderate aortic regurgitation.  08/05/2024  I discussed this with the patient's family members.  Also discussed with the electrophysiologist Dr. Gimenez.  Discussed with the nurse.  Patient may be extubated in a day or so but I will leave it up to the pulmonologist.    Review of Systems:    Symptom Y/N Comments  Symptom Y/N Comments   Fever/Chills    Chest Pain     Poor Appetite    Edema     Cough    Abdominal Pain     Sputum    Joint Pain     SOB/DELEON    Pruritis/Rash     Nausea/vomit    Other     Diarrhea         Constipation           Could NOT obtain due to: Patient is on ventilator.     Objective:          Physical Exam:    Last 24hrs VS reviewed since prior progress note. Most recent are:    /75   Pulse 76   Temp 99.5 °F (37.5 °C) (Axillary)   Resp 16   Ht 1.676 m (5' 6\")   Wt 76.2 kg (167 lb 15.9 oz)   SpO2 100%   BMI 27.11 kg/m²     Intake/Output Summary (Last 24 hours) at 8/5/2024 1237  Last data filed at 8/5/2024 1100  Gross per 24 hour   Intake 2896.24 ml   Output 1380 ml   Net 1516.24 ml          General Appearance: Well developed, well nourished, sedated and intubated   no acute distress.  Ears/Nose/Mouth/Throat: Hearing grossly normal.  Neck: Supple.  Chest: Patient is on ventilator support  Cardiovascular: Regular rate and rhythm, 
        Progress Note      8/6/2024 11:53 AM  NAME: CAROLINA CESPEDES   MRN:[id number]   Admit Diagnosis: Tachycardia [R00.0]  STEMI (ST elevation myocardial infarction) ([facility]) [I21.3]  Cardiac arrest with successful resuscitation ([facility]) [I46.9]  Cardiac arrest with ventricular fibrillation ([facility]) [I46.9, I49.01]      Subjective:   Patient had a cardiac catheterization performed and did not reveal critical coronary stenosis.  Mid right coronary artery is about 60% stenosis but is a very large vessel and this is a nonobstructive coronary disease.    She also got her echocardiogram on cath table and shows the left ventricle is dilated ejection fraction about 25% with a moderate aortic regurgitation.  08/06/2024  I discussed this with the patient's family members.  Also discussed with the electrophysiologist Dr. Gimenez.  Discussed with the nurse.  Patient is extubated and says he feels much better though she has some soreness of the chest wall where patient was shocked and had a CPR.    Review of Systems:    Symptom Y/N Comments  Symptom Y/N Comments   Fever/Chills n   Chest Pain y    Poor Appetite    Edema     Cough    Abdominal Pain     Sputum    Joint Pain     SOB/DELEON n   Pruritis/Rash     Nausea/vomit    Other y Memory lapse   Diarrhea         Constipation           Could NOT obtain due to:      Objective:          Physical Exam:    Last 24hrs VS reviewed since prior progress note. Most recent are:    BP (!) 143/95   Pulse 82   Temp 98.2 °F (36.8 °C) (Oral)   Resp 26   Ht 1.676 m (5' 6\")   Wt 75.1 kg (165 lb 9.1 oz)   SpO2 94%   BMI 26.72 kg/m²     Intake/Output Summary (Last 24 hours) at 8/6/2024 1153  Last data filed at 8/6/2024 0945  Gross per 24 hour   Intake 970 ml   Output 1150 ml   Net -180 ml          General Appearance: Well developed, well nourished, alert and oriented   no acute distress.  Ears/Nose/Mouth/Throat: Hearing grossly normal.  Neck: Supple.  Chest: No rales or rhonchi's.  Cardiovascular: 
        Progress Note      8/7/2024 11:29 AM  NAME: CAROLINA CESPEDES   MRN:[id number]   Admit Diagnosis: Tachycardia [R00.0]  STEMI (ST elevation myocardial infarction) ([facility]) [I21.3]  Cardiac arrest with successful resuscitation ([facility]) [I46.9]  Cardiac arrest with ventricular fibrillation ([facility]) [I46.9, I49.01]      Subjective:   Patient had a cardiac catheterization performed and did not reveal critical coronary stenosis.  Mid right coronary artery is about 60% stenosis but is a very large vessel and this is a nonobstructive coronary disease.    She also got her echocardiogram on cath table and shows the left ventricle is dilated ejection fraction about 25% with a moderate aortic regurgitation.  08/07/2024  I discussed this with the patient's family members.  Also discussed with the electrophysiologist Dr. Hinojosa.  Discussed with the nurse.  Patient is extubated and says he feels much better though she has some soreness of the chest wall where patient was shocked and had a CPR.  Patient is scheduled to have ICD insertion today.  Blood pressure still somewhat labile.    Review of Systems:    Symptom Y/N Comments  Symptom Y/N Comments   Fever/Chills n   Chest Pain y    Poor Appetite    Edema     Cough    Abdominal Pain     Sputum    Joint Pain     SOB/DELEON n   Pruritis/Rash     Nausea/vomit    Other y Memory lapse   Diarrhea         Constipation           Could NOT obtain due to:      Objective:          Physical Exam:    Last 24hrs VS reviewed since prior progress note. Most recent are:    BP (!) 168/94   Pulse 85   Temp 98.4 °F (36.9 °C) (Oral)   Resp 26   Ht 1.676 m (5' 6\")   Wt 74.6 kg (164 lb 7.4 oz)   SpO2 98%   BMI 26.55 kg/m²     Intake/Output Summary (Last 24 hours) at 8/7/2024 1129  Last data filed at 8/7/2024 0000  Gross per 24 hour   Intake 258.85 ml   Output 1300 ml   Net -1041.15 ml          General Appearance: Well developed, well nourished, alert and oriented   no acute 
        Progress Note      8/8/2024 11:52 AM  NAME: CAROLINA CESPEDES   MRN:[id number]   Admit Diagnosis: Tachycardia [R00.0]  STEMI (ST elevation myocardial infarction) ([facility]) [I21.3]  Cardiac arrest with successful resuscitation ([facility]) [I46.9]  Cardiac arrest with ventricular fibrillation ([facility]) [I46.9, I49.01]      Subjective:   Patient had a cardiac catheterization performed and did not reveal critical coronary stenosis.  Mid right coronary artery is about 60% stenosis but is a very large vessel and this is a nonobstructive coronary disease.    She also got her echocardiogram on cath table and shows the left ventricle is dilated ejection fraction about 25% with a moderate aortic regurgitation.  08/08/2024  I discussed this with the patient's family members.  Patient is extubated and says he feels much better though she has some soreness of the chest wall where patient was shocked and had a CPR.  Patient received ICD.  Blood pressure still somewhat labile.    Review of Systems:    Symptom Y/N Comments  Symptom Y/N Comments   Fever/Chills n   Chest Pain y    Poor Appetite    Edema     Cough    Abdominal Pain     Sputum    Joint Pain     SOB/DELEON n   Pruritis/Rash     Nausea/vomit    Other y Memory lapse   Diarrhea         Constipation           Could NOT obtain due to:      Objective:          Physical Exam:    Last 24hrs VS reviewed since prior progress note. Most recent are:    /88   Pulse 91   Temp 98.7 °F (37.1 °C) (Oral)   Resp 25   Ht 1.676 m (5' 6\")   Wt 74.6 kg (164 lb 7.4 oz)   SpO2 97%   BMI 26.55 kg/m²     Intake/Output Summary (Last 24 hours) at 8/8/2024 1152  Last data filed at 8/8/2024 0600  Gross per 24 hour   Intake 200 ml   Output 610 ml   Net -410 ml          General Appearance: Well developed, well nourished, alert and oriented   no acute distress.  Ears/Nose/Mouth/Throat: Hearing grossly normal.  Neck: Supple.  Chest: No rales or rhonchi's.  Cardiovascular: Regular rate and rhythm, S1,S2 
        Progress Note      8/9/2024 12:35 PM  NAME: CAROLINA CESPEDES   MRN:[id number]   Admit Diagnosis: Tachycardia [R00.0]  STEMI (ST elevation myocardial infarction) ([facility]) [I21.3]  Cardiac arrest with successful resuscitation ([facility]) [I46.9]  Cardiac arrest with ventricular fibrillation ([facility]) [I46.9, I49.01]      Subjective:   Patient had a cardiac catheterization performed and did not reveal critical coronary stenosis.  Mid right coronary artery is about 60% stenosis but is a very large vessel and this is a nonobstructive coronary disease.    She also got her echocardiogram on cath table and shows the left ventricle is dilated ejection fraction about 25% with a moderate aortic regurgitation.  08/09/2024  I discussed this with the patient's family members.  Patient is extubated and says he feels much better though she has some soreness of the chest wall where patient was shocked and had a CPR.  Patient received ICD.  Blood pressure still somewhat labile.  Memory seems to be improving.    Review of Systems:    Symptom Y/N Comments  Symptom Y/N Comments   Fever/Chills n   Chest Pain y    Poor Appetite    Edema     Cough    Abdominal Pain     Sputum    Joint Pain     SOB/DELEON n   Pruritis/Rash     Nausea/vomit    Other y Memory lapse   Diarrhea         Constipation           Could NOT obtain due to:      Objective:          Physical Exam:    Last 24hrs VS reviewed since prior progress note. Most recent are:    /85   Pulse 90   Temp 98.8 °F (37.1 °C) (Oral)   Resp 24   Ht 1.676 m (5' 6\")   Wt 74.6 kg (164 lb 7.4 oz)   SpO2 99%   BMI 26.55 kg/m²     Intake/Output Summary (Last 24 hours) at 8/9/2024 1235  Last data filed at 8/9/2024 0600  Gross per 24 hour   Intake 172.67 ml   Output 750 ml   Net -577.33 ml          General Appearance: Well developed, well nourished, alert and oriented   no acute distress.  Ears/Nose/Mouth/Throat: Hearing grossly normal.  Neck: Supple.  Chest: No rales or 
    Hospitalist Progress Note               Daily Progress Note: 8/5/2024      Hospital Day: 2     Chief complaint:   Chief Complaint   Patient presents with    Stemi      53-year-old female with uncontrolled hypertension, sustained outside of hospital witnessed cardiac arrest.  V-fib was noted, patient received 4 defibrillations with cardioversion, then went to torsade the point and was given magnesium sulfate.  Subsequently converted to sinus rhythm and had ROSC.  She was intubated in the field.  Underwent cardiac catheterization which showed 60% LAD lesion which is likely not a culprit.  EF 15% per cardiac catheterization, 25% per echo.    8/5  Per nursing staff, patient arousable and intermittently agitated.  Considering decreasing sedation and extubation today-pulmonology to coordinate with cardiology.   at bedside, denies drug use, states drinking 1-2 drinks but never heavy drinker.        Medications reviewed  Current Facility-Administered Medications   Medication Dose Route Frequency    amiodarone (NEXTERONE) 360 mg in dextrose 5% 200 ml  0.5 mg/min IntraVENous Continuous    insulin lispro (HUMALOG,ADMELOG) injection vial 0-8 Units  0-8 Units SubCUTAneous TID WC    insulin lispro (HUMALOG,ADMELOG) injection vial 0-4 Units  0-4 Units SubCUTAneous Nightly    glucose chewable tablet 16 g  4 tablet Oral PRN    dextrose bolus 10% 125 mL  125 mL IntraVENous PRN    Or    dextrose bolus 10% 250 mL  250 mL IntraVENous PRN    glucagon injection 1 mg  1 mg SubCUTAneous PRN    dextrose 10 % infusion   IntraVENous Continuous PRN    [Held by provider] sacubitril-valsartan (ENTRESTO) 24-26 MG per tablet 1 tablet  1 tablet Oral BID    [Held by provider] carvedilol (COREG) tablet 6.25 mg  6.25 mg Oral BID WC    [Held by provider] spironolactone (ALDACTONE) tablet 25 mg  25 mg Oral Daily    atorvastatin (LIPITOR) tablet 40 mg  40 mg Oral Nightly    aspirin chewable tablet 81 mg  81 mg Oral Daily    sodium chloride 
    Hospitalist Progress Note               Daily Progress Note: 8/6/2024      Hospital Day: 3     Chief complaint:   Chief Complaint   Patient presents with    Stemi      53-year-old female with uncontrolled hypertension, sustained outside of hospital witnessed cardiac arrest.  V-fib was noted, patient received 4 defibrillations with cardioversion, then went to torsade the point and was given magnesium sulfate.  Subsequently converted to sinus rhythm and had ROSC.  She was intubated in the field.  Underwent cardiac catheterization which showed 60% LAD lesion which is likely not a culprit.  EF 15% per cardiac catheterization, 25% per echo.    8/5  Per nursing staff, patient arousable and intermittently agitated.  Considering decreasing sedation and extubation today-pulmonology to coordinate with cardiology.   at bedside, denies drug use, states drinking 1-2 drinks but never heavy drinker.  Extubated successfully during the day.  CT angio ordered yesterday not yet done    8/6  Alert, not in acute distress, complaining of mild chest pain due to CPR.  Continues to have episodes of wide complex tachyarrhythmia, no longer than a few seconds.  Magnesium and potassium okay.  Plan for AICD tomorrow.  CT angio still pending        Medications reviewed  Current Facility-Administered Medications   Medication Dose Route Frequency    sacubitril-valsartan (ENTRESTO) 49-51 MG per tablet 1 tablet  1 tablet Oral BID    lidocaine (XYLOCAINE) 5 % ointment   Topical PRN    heparin (porcine) injection 5,000 Units  5,000 Units SubCUTAneous 3 times per day    famotidine (PEPCID) tablet 20 mg  20 mg Oral BID    thiamine (B-1) injection 100 mg  100 mg IntraVENous Daily    dexmedeTOMIDine (PRECEDEX) 400 mcg in sodium chloride 0.9 % 100 mL infusion  0.1-1.5 mcg/kg/hr IntraVENous Continuous    amiodarone (NEXTERONE) 360 mg in dextrose 5% 200 ml  0.5 mg/min IntraVENous Continuous    insulin lispro (HUMALOG,ADMELOG) injection vial 0-8 
    Hospitalist Progress Note               Daily Progress Note: 8/7/2024      Hospital Day: 4     Chief complaint:   Chief Complaint   Patient presents with    Stemi      53-year-old female with uncontrolled hypertension, sustained outside of hospital witnessed cardiac arrest.  V-fib was noted, patient received 4 defibrillations with cardioversion, then went to torsade the point and was given magnesium sulfate.  Subsequently converted to sinus rhythm and had ROSC.  She was intubated in the field.  Underwent cardiac catheterization which showed 60% LAD lesion which is likely not a culprit.  EF 15% per cardiac catheterization, 25% per echo.    8/5  Per nursing staff, patient arousable and intermittently agitated.  Considering decreasing sedation and extubation today-pulmonology to coordinate with cardiology.   at bedside, denies drug use, states drinking 1-2 drinks but never heavy drinker.  Extubated successfully during the day.  CT angio ordered yesterday not yet done    8/6  Alert, not in acute distress, complaining of mild chest pain due to CPR.  Continues to have episodes of wide complex tachyarrhythmia, no longer than a few seconds.  Magnesium and potassium okay.  Plan for AICD tomorrow.  CT angio still pending    8/7  To EP lab for AICD placement.  Patient noted to have decreased short memory since the arrest.        Medications reviewed  Current Facility-Administered Medications   Medication Dose Route Frequency    amiodarone (CORDARONE) tablet 400 mg  400 mg Oral BID    [START ON 8/11/2024] amiodarone (CORDARONE) tablet 200 mg  200 mg Oral Daily    carvedilol (COREG) tablet 12.5 mg  12.5 mg Oral BID     sacubitril-valsartan (ENTRESTO) 49-51 MG per tablet 1 tablet  1 tablet Oral BID    lidocaine (XYLOCAINE) 5 % ointment   Topical PRN    famotidine (PEPCID) tablet 20 mg  20 mg Oral BID    thiamine (B-1) injection 100 mg  100 mg IntraVENous Daily    insulin lispro (HUMALOG,ADMELOG) injection vial 0-8 
    Hospitalist Progress Note               Daily Progress Note: 8/8/2024      Hospital Day: 5     Chief complaint:   Chief Complaint   Patient presents with    Stemi      53-year-old female with uncontrolled hypertension, sustained outside of hospital witnessed cardiac arrest.  V-fib was noted, patient received 4 defibrillations with cardioversion, then went to torsade the point and was given magnesium sulfate.  Subsequently converted to sinus rhythm and had ROSC.  She was intubated in the field.  Underwent cardiac catheterization which showed 60% LAD lesion which is likely not a culprit.  EF 15% per cardiac catheterization, 25% per echo.    8/5  Per nursing staff, patient arousable and intermittently agitated.  Considering decreasing sedation and extubation today-pulmonology to coordinate with cardiology.   at bedside, denies drug use, states drinking 1-2 drinks but never heavy drinker.  Extubated successfully during the day.  CT angio ordered yesterday not yet done    8/6  Alert, not in acute distress, complaining of mild chest pain due to CPR.  Continues to have episodes of wide complex tachyarrhythmia, no longer than a few seconds.  Magnesium and potassium okay.  Plan for AICD tomorrow.  CT angio shows no PE    8/7  To EP lab for AICD placement.  Patient noted to have decreased short memory since the arrest.    8/8  AICD placed yesterday.  Today right forearm is swollen, indurated and slightly red.  Ultrasound shows functional DVT.  Requested teleneurology consult        Medications reviewed  Current Facility-Administered Medications   Medication Dose Route Frequency    lactated ringers IV soln infusion   IntraVENous Once    lidocaine 4 % external patch 1 patch  1 patch Topical PRN    HYDROmorphone HCl PF (DILAUDID) injection 0.5 mg  0.5 mg IntraVENous Q5 Min PRN    ondansetron (ZOFRAN) injection 4 mg  4 mg IntraVENous Once PRN    LORazepam (ATIVAN) injection 0.5 mg  0.5 mg IntraVENous Once PRN    
.Progress Note (Hospitalist, Internal Medicine)  IDENTIFYING INFORMATION   PATIENT:  CAROLINA CESPEDES  MRN:  [id number]  ADMIT DATE: 8/4/2024  TIME OF EVALUATION: 8/9/2024 3:37 PM      HISTORY OF PRESENT ILLNESS   CAROLINA CESPEDES is a 53 y.o. adult who presents with       SUBJECTIVE     Patient is well paged.  Hemodynamically stable and has no new complaints.    MEDICATIONS   Medications Prior to Admission  No medications prior to admission.    Current Medications  Current Facility-Administered Medications   Medication Dose Route Frequency Provider Last Rate Last Admin    heparin (porcine) injection 6,000 Units  80 Units/kg IntraVENous PRN Renee Falcon MD        heparin (porcine) injection 3,000 Units  40 Units/kg IntraVENous PRN Renee Falcon MD   3,000 Units at 08/09/24 1107    doxycycline hyclate (VIBRAMYCIN) capsule 100 mg  100 mg Oral 2 times per day Isabel Dhillon MD        furosemide (LASIX) tablet 40 mg  40 mg Oral Daily Isabel Dhillon MD   40 mg at 08/09/24 0910    heparin 25,000 units in dextrose 5% 250 mL (premix) infusion  5-30 Units/kg/hr IntraVENous Continuous Renee Falcon MD 16.4 mL/hr at 08/09/24 1101 22 Units/kg/hr at 08/09/24 1101    amiodarone (CORDARONE) tablet 400 mg  400 mg Oral BID Hallie Hinojosa MD   400 mg at 08/09/24 0910    [START ON 8/11/2024] amiodarone (CORDARONE) tablet 200 mg  200 mg Oral Daily Hallie Hinojosa MD        carvedilol (COREG) tablet 12.5 mg  12.5 mg Oral BID  Hallie Hinojosa MD   12.5 mg at 08/09/24 0910    sacubitril-valsartan (ENTRESTO) 49-51 MG per tablet 1 tablet  1 tablet Oral BID Sha Salas MD   1 tablet at 08/09/24 0909    lidocaine (XYLOCAINE) 5 % ointment   Topical PRN Sha Salas MD        famotidine (PEPCID) tablet 20 mg  20 mg Oral BID Isabel Dhillon MD   20 mg at 08/09/24 0909    thiamine (B-1) injection 100 mg  100 mg IntraVENous Daily Renee Falcon MD   100 mg at 08/09/24 0910    insulin lispro (HUMALOG,ADMELOG) injection vial 0-8 Units  0-8 
.Progress Note (Hospitalist, Internal Medicine)  IDENTIFYING INFORMATION   PATIENT:  CAROLINA CESPEDES  MRN:  [id number]  ADMIT DATE: 8/4/2024  TIME OF EVALUATION: 8/10/2024 7:33 PM      HISTORY OF PRESENT ILLNESS   CAROLINA CESPEDES is a 53 y.o. adult who presents with       SUBJECTIVE     She is appropriately paced, hemodynamically stable and has no new complaints.    MEDICATIONS   Medications Prior to Admission  No medications prior to admission.    Current Medications  Current Facility-Administered Medications   Medication Dose Route Frequency Provider Last Rate Last Admin    heparin (porcine) injection 6,000 Units  80 Units/kg IntraVENous PRN Renee Falcon MD        heparin (porcine) injection 3,000 Units  40 Units/kg IntraVENous PRN Renee Falcon MD   3,000 Units at 08/09/24 1822    doxycycline hyclate (VIBRAMYCIN) capsule 100 mg  100 mg Oral 2 times per day Isabel Dhillon MD   100 mg at 08/10/24 0836    furosemide (LASIX) tablet 40 mg  40 mg Oral Daily Isabel Dhillon MD   40 mg at 08/10/24 0836    heparin 25,000 units in dextrose 5% 250 mL (premix) infusion  5-30 Units/kg/hr IntraVENous Continuous Renee Falcon MD 17.9 mL/hr at 08/10/24 1742 24 Units/kg/hr at 08/10/24 1742    amiodarone (CORDARONE) tablet 400 mg  400 mg Oral BID Hallie Hinojosa MD   400 mg at 08/10/24 0835    [START ON 8/11/2024] amiodarone (CORDARONE) tablet 200 mg  200 mg Oral Daily Hallie Hinojosa MD        carvedilol (COREG) tablet 12.5 mg  12.5 mg Oral BID  Hallie Hinojosa MD   12.5 mg at 08/10/24 1742    sacubitril-valsartan (ENTRESTO) 49-51 MG per tablet 1 tablet  1 tablet Oral BID Sha Salas MD   1 tablet at 08/10/24 0836    lidocaine (XYLOCAINE) 5 % ointment   Topical PRN Sha Salas MD        famotidine (PEPCID) tablet 20 mg  20 mg Oral BID Isabel Dhillon MD   20 mg at 08/10/24 0836    thiamine (B-1) injection 100 mg  100 mg IntraVENous Daily Renee Falcon MD   100 mg at 08/10/24 1018    insulin lispro (HUMALOG,ADMELOG) 
.Progress Note (Hospitalist, Internal Medicine)  IDENTIFYING INFORMATION   PATIENT:  CAROLINA CESPEDES  MRN:  [id number]  ADMIT DATE: 8/4/2024  TIME OF EVALUATION: 8/13/2024 3:44 PM      HISTORY OF PRESENT ILLNESS   CAROLINA CESPEDES is a 53 y.o. adult who presents with       SUBJECTIVE     Patient is feeling well.  Denies shortness of breath but reports residual chest wall pain.  Currently on heparin.  2D echo ordered    MEDICATIONS   Medications Prior to Admission  No medications prior to admission.    Current Medications  Current Facility-Administered Medications   Medication Dose Route Frequency Provider Last Rate Last Admin    apixaban (ELIQUIS) tablet 10 mg  10 mg Oral BID Eldon Marquez MD   10 mg at 08/13/24 0805    Followed by    [START ON 8/18/2024] apixaban (ELIQUIS) tablet 5 mg  5 mg Oral BID Eldon Marquez MD        doxycycline hyclate (VIBRAMYCIN) capsule 100 mg  100 mg Oral 2 times per day Isabel Dhillon MD   100 mg at 08/13/24 0805    furosemide (LASIX) tablet 40 mg  40 mg Oral Daily Isabel Dhillon MD   40 mg at 08/13/24 0805    amiodarone (CORDARONE) tablet 200 mg  200 mg Oral Daily Hallie Hinojosa MD   200 mg at 08/13/24 0806    carvedilol (COREG) tablet 12.5 mg  12.5 mg Oral BID  Hallie Hinojosa MD   12.5 mg at 08/13/24 0805    sacubitril-valsartan (ENTRESTO) 49-51 MG per tablet 1 tablet  1 tablet Oral BID Sha Salas MD   1 tablet at 08/13/24 0805    lidocaine (XYLOCAINE) 5 % ointment   Topical PRN Sha Salas MD        famotidine (PEPCID) tablet 20 mg  20 mg Oral BID Isabel Dhillon MD   20 mg at 08/13/24 0806    thiamine (B-1) injection 100 mg  100 mg IntraVENous Daily Renee Falcon MD   100 mg at 08/13/24 0806    spironolactone (ALDACTONE) tablet 25 mg  25 mg Oral Daily Sha Salas MD   25 mg at 08/13/24 0805    atorvastatin (LIPITOR) tablet 40 mg  40 mg Oral Nightly Sha Salas MD   40 mg at 08/12/24 2036    aspirin chewable tablet 81 mg  81 mg Oral Daily Sha Salas MD 
1215: Spoke with Dr. Salas at bedside, pulmonology wants his input prior to extubation, Dr. Salas states it is OK to try to extubate today.       1534: Precedex gtt started, pt with a history of alcohol use, also when patient wakes she becomes very anxious and reaches for ETT. Will attempt SAT/SBT after monitoring precedex gtt.     1550: Propofol was stopped and Precedex continued. Within minutes patient awake, following commands. Sister and daughters at bedside helping to keep patient calm. SBT trial attempted, patient tolerated well. Dr. Dhillon assessed at bedside, OK to extubate. Pt was extubated to o2 @ 2lmp v. NC. Tolerated well.       
4 Eyes Skin Assessment     NAME:  CAROLINA CESPEDES  YOB: 1970  MEDICAL RECORD NUMBER:  [id number]    The patient is being assessed for  Transfer to New Unit    I agree that at least one RN has performed a thorough Head to Toe Skin Assessment on the patient. ALL assessment sites listed below have been assessed.      Areas assessed by both nurses:    Head, Face, Ears, Shoulders, Back, Chest, Arms, Elbows, Hands, Sacrum. Buttock, Coccyx, Ischium, Legs. Feet and Heels, and Under Medical Devices         Does the Patient have a Wound? No noted wound(s)       [place] Prevention initiated by RN: Yes  Wound Care Orders initiated by RN: No    Pressure Injury (Stage 3,4, Unstageable, DTI, NWPT, and Complex wounds) if present, place Wound referral order by RN under : No    New Ostomies, if present place, Ostomy referral order under : No     Nurse 1 eSignature: Electronically signed by Fe Oliver RN on 8/9/24 at 6:06 PM EDT    **SHARE this note so that the co-signing nurse can place an eSignature**    Nurse 2 eSignature: Electronically signed by Manuela Gao RN on 8/9/24 at 6:58 PM EDT    
An outpatient cardiac referral has been made to [facility]. Patient's information was forwarded to this facility. Patient will be contacted by this facility to schedule an initial appointment. This referral information has been included in the patient's discharge instructions. Contact information for cardiac rehab facility was provided as well.  
Comprehensive Nutrition Assessment    Type and Reason for Visit:  Initial, RD Nutrition Re-Screen/LOS    Nutrition Recommendations/Plan:   Continue current diet order, encourage po intake  RD to monitor weight, po intake, labs, GI function     Malnutrition Assessment:  Malnutrition Status:  Insufficient data (08/12/24 1330)    Context:  Acute Illness     Findings of the 6 clinical characteristics of malnutrition:  Energy Intake:  No significant decrease in energy intake  Weight Loss:  Greater than 2% over 1 week     Body Fat Loss:  Unable to assess     Muscle Mass Loss:  Unable to assess    Fluid Accumulation:  Unable to assess     Strength:  Not Performed    Nutrition Assessment:    Pt assessed for LOS. Pt admitted for cardiac arrest, s/p AICD placement 8/7. Pt currently on a regular diet, good po intakes. Meds: eliquis, lipitor, coreg, pepcid, lasix, humalog, aldactone, thiamine. Labs: A1c 5.8% H.    Nutrition Related Findings:    PO intakes 51-75%. Last BM 8/10. Wound Type: None       Current Nutrition Intake & Therapies:    Average Meal Intake: 51-75%  Average Supplements Intake: None Ordered  ADULT DIET; Regular    Anthropometric Measures:  Height: 167.6 cm (5' 6\")  Ideal Body Weight (IBW):   ( )       Current Body Weight: 74.4 kg (164 lb),   IBW. Weight Source: Bed Scale  Current BMI (kg/m2): 26.5        Weight Adjustment For: No Adjustment     BMI Categories: Overweight (BMI 25.0-29.9)  Last Weight Metrics:      8/12/2024     1:14 PM 8/6/2024     8:00 PM 8/6/2024     1:00 AM 8/4/2024     7:00 PM 8/4/2024     1:14 PM   Weight Loss Metrics   Height 5' 6\"    5' 6\"   Weight - Scale  164 lbs 7 oz 165 lbs 9 oz 168 lbs 175 lbs   BMI (Calculated)  26.6 kg/m2 26.8 kg/m2 27.2 kg/m2 28.3 kg/m2      Estimated Daily Nutrient Needs:  Energy Requirements Based On: Kcal/kg  Weight Used for Energy Requirements: Current  Energy (kcal/day): 1850-2220kcals (25-30kcal/kg)  Weight Used for Protein Requirements: 
Discussed patient with Dr. Salas. Plan to hold Entresto, Coreg, and Aldactone while hypotensive on Levophed. Reevaluate once BP issues resolve.   
Dr. Salas asked this writer if cardiac rehab was involved in pt's care, advised that he would like a referral to be completed if it had not been done so yet.  Called Oneyda Puckett at [phone] of request for cardiac rehab for this pt, she is aware and will set up tomorrow prior to DC.  Sisi Mckeon RN 1:33 PM    
Electrophysiology Progress Note  [facility]    Reason for Consult:  VF arrest  Requesting Physician:  Dr. Salas    CHIEF COMPLAINT:  VF arrest    HISTORY OF PRESENT ILLNESS:      The patient is a 53 y.o. year old adult with significant past medical history of HTN  who presents with documented VF arrest. Patient had defibrillation x 3 in the field and presented to [facility] ER. Noted to be in atrial flutter and cardioverted again. She underwent an emergent cath (no significant CAD) and EF 25%. Continued to have recurrent PVC on T wave mediated TdP and NSVT. Started on amiodarone.     SUBJECTIVE: feels well; occasional PVCs    Past Medical History:        Diagnosis Date    Hypertension     Does not take meds     Past Surgical History:        Procedure Laterality Date    CARDIAC PROCEDURE N/A 8/4/2024    Left heart cath / coronary angiography performed by Sha Salas MD at [facility] CARDIAC CATH LAB    CARDIAC PROCEDURE N/A 8/4/2024    Aortic root aortogram performed by Sha Salas MD at [facility] CARDIAC CATH LAB    EP DEVICE PROCEDURE N/A 8/7/2024    Insert ICD dual performed by Hallie Hinojosa MD at [facility] ELECTROPHYSIOLOGY    INVASIVE VASCULAR N/A 8/7/2024    Venogram upper ext left performed by Hallie Hinojosa MD at [facility] ELECTROPHYSIOLOGY     Current Medications:    Current Facility-Administered Medications: lactated ringers IV soln infusion, , IntraVENous, Once  lidocaine 4 % external patch 1 patch, 1 patch, Topical, PRN  HYDROmorphone HCl PF (DILAUDID) injection 0.5 mg, 0.5 mg, IntraVENous, Q5 Min PRN  ondansetron (ZOFRAN) injection 4 mg, 4 mg, IntraVENous, Once PRN  LORazepam (ATIVAN) injection 0.5 mg, 0.5 mg, IntraVENous, Once PRN  diphenhydrAMINE (BENADRYL) injection 12.5 mg, 12.5 mg, IntraVENous, Once PRN  labetalol (NORMODYNE;TRANDATE) injection 10 mg, 10 mg, IntraVENous, Q15 Min PRN **OR** hydrALAZINE (APRESOLINE) injection 10 mg, 10 mg, IntraVENous, Q15 Min PRN  ipratropium 0.5 mg-albuterol 2.5 mg 
I have evaluated the patient a couple times during the course of the day.  Cardiology cleared her for extubation.  Patient on my evaluation was on Precedex 0.4 mics.  Family at the bedside.  Awake and alert and following commands.  Switched over to pressure support of 8 with 30% O2 and doing fairly well.  Good [place] ratio.  Will observe her for another 15 to 30 minutes.  If she continues to do well we will proceed with extubation.  She will be placed on nasal cannula O2 after extubation.  Discussed with the nursing staff.  Slowly wean her off of Precedex over the course of the night or in the morning.  I spent additional 30 minutes of in the management of this patient.  
No outpatient cardiac rehab referral per Dr. ROXANNE Salas's instructions.  
OCCUPATIONAL THERAPY EVALUATION  Patient: CAROLINA CESPEDES (53 y.o. adult)  Date: 8/9/2024  Primary Diagnosis: Tachycardia [R00.0]  STEMI (ST elevation myocardial infarction) (HCC) [I21.3]  Cardiac arrest with successful resuscitation (HCC) [I46.9]  Cardiac arrest with ventricular fibrillation (HCC) [I46.9, I49.01]  Procedure(s) (LRB):  Insert ICD dual (N/A)  Venogram upper ext left (N/A) 2 Days Post-Op   Precautions: Fall Risk, Other (comment) (Pacemaker Precautions)                Recommendations for nursing mobility: Out of bed to chair for meals, AD and gt belt for bed to chair , Amb to bathroom with AD and gait belt, and Assist x1    In place during session:Peripheral IV, External Catheter, EKG/telemetry , and Pulse ox  ASSESSMENT  Pt is a 53 y.o. adult presenting to [facility] following witnessed cardiac arrest, admitted 8/4/2024 and currently being treated for s/p cardiac arrest V-fib w/ cardioversionx4, VDRF intubated 8/4 and extubated 8/5, RUE functional DVT w/ mild thrombus, severe nonischemic cardiomyopathy, s/p AICD placement POD#2, HTN, mild acute kidney injury. Pt received semi-supine in bed upon arrival, AXO x4, and agreeable to OT evaluation. Pt's significant other was present in room throughout session w/ pt's consent. Pt educated on precautions prior to mobility tasks, pt verbalized understanding.    Based on current observations, pt presents with decreased  functional mobility, independence in ADLs, high-level IADLs, ROM, strength, activity tolerance, endurance, safety awareness, coordination, balance, increased pain levels (see below for objective details and assist levels).     Overall, pt tolerates session fair today with c/o 7-8/10 pain in chest and dizziness w/ OOB mobility. Pt completed sup to sit and scooting to EOB SBA w/ verbal cues to maintain precautions and increased time. Pt reported some dizziness seated EOB, allowed time to rest at EOB. While resting EOB,  strength assessed WFL. BUE ROM 
OCCUPATIONAL THERAPY TREATMENT  Patient: CAROLINA CESPEDES (53 y.o. adult)  Date: 8/13/2024  Primary Diagnosis: Tachycardia [R00.0]  STEMI (ST elevation myocardial infarction) (HCC) [I21.3]  Cardiac arrest with successful resuscitation (HCC) [I46.9]  Cardiac arrest with ventricular fibrillation (HCC) [I46.9, I49.01]  Procedure(s) (LRB):  Insert ICD dual (N/A)  Venogram upper ext left (N/A) 6 Days Post-Op   Precautions: Fall Risk, Other (comment) (Pacemaker Precautions)                Recommendations for nursing mobility: Out of bed to chair for meals, Encourage HEP in prep for ADLs/mobility; see handout for details, AD and gt belt for bed to chair , and Amb to bathroom with AD and gait belt    In place during session: EKG/telemetry   Chart, occupational therapy assessment, plan of care, and goals were reviewed.  ASSESSMENT  Patient continues with skilled OT services and is progressing towards goals. Pt semi supine in bed upon SEQUEIRA arrival, agreeable to session. Pt A&O x 4. Spouse in room w/ pt's permission during session.  Education provided on energy conservation , UE dressing ie L UE in first and out last technique to adhere to pacemaker precautions and HEP w/ both verbalizing good understanding. Pt completed UE therex, see grid below for details, to maintain/ increase strength and endurance to aid in adl performance. Pt independent w/ bed mobility. Reviewed use of sling for oob activity w/ pt verbalizing good understanding. Also discussed DME needs for home.  Pt reports that she has what she needs.  All known needs met. (See below for objective details and assist levels).     Overall pt tolerated session well today with rest breaks. Potential barriers for safe discharge: concern for pt safely navigating or managing the home environment. Current OT recommendations for discharge Home with Home Health Therapy and family assist. Will continue to benefit from skilled OT services, and will continue to progress as 
Patient transferred to CVICU 276 via bed on monitor from cath lab. Report given at bedside. Patient sedated and WD from pain, afebrile, SR on the monitor, +1 BLE edema, pulses palpable, and on ventilator. OGT in placed on LIS, Sr catheter. Skin intact with generalized bruising.   
Pressure support 8  
Pulmonary Progress Note      NAME: CAROLINA CESPEDES   :  1970  MRM:  [id number]    Date/Time: 2024  12:19 PM  Thank you for this referral  History is obtained after discussion with attending physician, review of cardiology note and information that is gathered from nurse  Patient is currently intubated and sedated.  Her nail is not clear at the time of filing of this report   Patient is a 54-year-old female and was at home and sitting and suddenly her eyes rolled back and she became unresponsive and her lips turned blue and her better half was in the home and he realized and gave her CPR and called 911.  When paramedics presented her better have continued to give CPR for about 6 minutes.  Patient is found to be in ventricular tachycardia and fibrillation and she was shocked.  Again she went into torsade the point and was given magnesium sulfate.  Patient is brought to emergency room with the CPR in progress.  We did get blood pressure and when in ER on initial evaluation was in atrial flutter with 2-1 block and cardioverted in the emergency room.  Patient was found to have STEMI on EKG.    She was revealed straightaway to cardiac cath.    She was transferred to ICU intubated and sedated.    Cardiac cath was unremarkable for significant coronary artery stenosis.  She is known to have poor ejection fraction of 10 to 15%   Currently she has good blood pressure   Vent settings are reviewed on assist-control rate of 12, breathing at rate of 16, tidal volume of 500 mL with FiO2 of 80%.    Patient is saturating 100% at bedside.  No history of chest pain or palpitation or shortness of breath prior to this episode.  Patient is reported to have history of hypertension         Subjective:     Patient seen and examined.  Discussed with the ICU team.  She is sitting in the recliner.  Family at the bedside.  She is in better spirits.  The surgical site of AICD on the chest still hurts.  She is coughing up some sputum.  
Pulmonary Progress Note      NAME: CAROLINA CESPEDES   :  1970  MRM:  [id number]    Date/Time: 8/10/2024  3:56 PM  Thank you for this referral  History is obtained after discussion with attending physician, review of cardiology note and information that is gathered from nurse  Patient is currently intubated and sedated.  Her nail is not clear at the time of filing of this report   Patient is a 54-year-old female and was at home and sitting and suddenly her eyes rolled back and she became unresponsive and her lips turned blue and her better half was in the home and he realized and gave her CPR and called 911.  When paramedics presented her better have continued to give CPR for about 6 minutes.  Patient is found to be in ventricular tachycardia and fibrillation and she was shocked.  Again she went into torsade the point and was given magnesium sulfate.  Patient is brought to emergency room with the CPR in progress.  We did get blood pressure and when in ER on initial evaluation was in atrial flutter with 2-1 block and cardioverted in the emergency room.  Patient was found to have STEMI on EKG.    She was revealed straightaway to cardiac cath.    She was transferred to ICU intubated and sedated.    Cardiac cath was unremarkable for significant coronary artery stenosis.  She is known to have poor ejection fraction of 10 to 15%   Currently she has good blood pressure   Vent settings are reviewed on assist-control rate of 12, breathing at rate of 16, tidal volume of 500 mL with FiO2 of 80%.    Patient is saturating 100% at bedside.  No history of chest pain or palpitation or shortness of breath prior to this episode.  Patient is reported to have history of hypertension         Subjective:     Patient seen and examined in her room on the floor this afternoon, no acute events overnight.  Transfer out of the ICU, saturating well on room air.  BMP stable, leukocytosis improving.  No new changes to medications today from 
Pulmonary Progress Note      NAME: CAROLINA CESPEDES   :  1970  MRM:  [id number]    Date/Time: 2024  11:22 AM  Thank you for this referral  History is obtained after discussion with attending physician, review of cardiology note and information that is gathered from nurse  Patient is currently intubated and sedated.  Her nail is not clear at the time of filing of this report   Patient is a 54-year-old female and was at home and sitting and suddenly her eyes rolled back and she became unresponsive and her lips turned blue and her better half was in the home and he realized and gave her CPR and called 911.  When paramedics presented her better have continued to give CPR for about 6 minutes.  Patient is found to be in ventricular tachycardia and fibrillation and she was shocked.  Again she went into torsade the point and was given magnesium sulfate.  Patient is brought to emergency room with the CPR in progress.  We did get blood pressure and when in ER on initial evaluation was in atrial flutter with 2-1 block and cardioverted in the emergency room.  Patient was found to have STEMI on EKG.    She was revealed straightaway to cardiac cath.    She was transferred to ICU intubated and sedated.    Cardiac cath was unremarkable for significant coronary artery stenosis.  She is known to have poor ejection fraction of 10 to 15%   Currently she has good blood pressure   Vent settings are reviewed on assist-control rate of 12, breathing at rate of 16, tidal volume of 500 mL with FiO2 of 80%.    Patient is saturating 100% at bedside.  No history of chest pain or palpitation or shortness of breath prior to this episode.  Patient is reported to have history of hypertension         Subjective:     Patient seen and examined.  Discussed with the ICU team.  She is on 2 L oxygen.   at the bedside.  Her memory is getting better slowly.  No problems swallowing.  She has some cough and sputum production.  Doing incentive 
Pulmonary Progress Note      NAME: CAROLINA CESPEDES   :  1970  MRM:  [id number]    Date/Time: 2024  3:07 PM         Subjective:     Patient seen and examined in her room on the floor this afternoon, no acute events overnight.  Doing well on the floor, BMP stable.  Saturating well on room air.  Cardiology planning repeat TTE in the few days.  Ambulated with physical therapy.  Complains of shortness of breath on moderate physical activity.      Past Medical History reviewed and unchanged from Admission History and Physical       Objective:     Physical Exam     Vitals:      Last 24hrs VS reviewed since prior progress note. Most recent are:    Vitals:    24 1125   BP: 113/81   Pulse: 89   Resp: 18   Temp: 98.1 °F (36.7 °C)   SpO2: 99%     SpO2 Readings from Last 6 Encounters:   24 99%          Intake/Output Summary (Last 24 hours) at 2024 1507  Last data filed at 2024 1127  Gross per 24 hour   Intake 1140 ml   Output 150 ml   Net 990 ml      Exam:    General:  Awake and alert.  On room air.   Eyes:  Sclera anicteric. Pupils equal, round, reactive to light.   Mouth/Throat: Normal exam   Neck: Supple.  JVD noted   Lungs:   Few rhonchi which cleared by coughing.   Cardiovascular:  Regular rate and rhythm, no murmur, click, rub, or gallop.   Abdomen:   Soft, non-tender, bowel sounds normal, non-distended.   Extremities: No cyanosis or edema.   Skin: No acute rash or lesions.   Lymph Nodes: Cervical and supraclavicular normal.   Musculoskeletal:  No swelling or deformity.   Lines/Devices:  Intact, no erythema, drainage, or tenderness.           Vascular duplex upper extremity venous right   Final Result      XR CHEST (2 VW)   Final Result   Intact pacer leads extend to the right atrium and right ventricle.   No pneumothorax.         Electronically signed by Vinicio Purvis MD      XR CHEST 1 VIEW   Final Result   1. There is mild interstitial edema.   2. No change small left pleural effusion and 
Pulmonary Progress Note      NAME: CAROLINA CESPEDES   :  1970  MRM:  [id number]    Date/Time: 2024  10:51 AM  Thank you for this referral  History is obtained after discussion with attending physician, review of cardiology note and information that is gathered from nurse  Patient is currently intubated and sedated.  Her nail is not clear at the time of filing of this report   Patient is a 54-year-old female and was at home and sitting and suddenly her eyes rolled back and she became unresponsive and her lips turned blue and her better half was in the home and he realized and gave her CPR and called 911.  When paramedics presented her better have continued to give CPR for about 6 minutes.  Patient is found to be in ventricular tachycardia and fibrillation and she was shocked.  Again she went into torsade the point and was given magnesium sulfate.  Patient is brought to emergency room with the CPR in progress.  We did get blood pressure and when in ER on initial evaluation was in atrial flutter with 2-1 block and cardioverted in the emergency room.  Patient was found to have STEMI on EKG.    She was revealed straightaway to cardiac cath.    She was transferred to ICU intubated and sedated.    Cardiac cath was unremarkable for significant coronary artery stenosis.  She is known to have poor ejection fraction of 10 to 15%   Currently she has good blood pressure   Vent settings are reviewed on assist-control rate of 12, breathing at rate of 16, tidal volume of 500 mL with FiO2 of 80%.    Patient is saturating 100% at bedside.  No history of chest pain or palpitation or shortness of breath prior to this episode.  Patient is reported to have history of hypertension         Subjective:     Patient seen and examined.  Discussed with the ICU team.  She is on room air.  She underwent AICD placement yesterday.  She is still complaining of chest pain.  Breathing is better.  Less sputum production.  She is trying to do 
Pulmonary Progress Note      NAME: CAROLINA CESPEDES   :  1970  MRM:  [id number]    Date/Time: 2024  4:42 PM         Subjective:     Patient seen and examined in her room on the floor this afternoon, no acute events overnight.  Doing well on the floor, BMP stable.  Saturating well on room air.  Cardiology planning repeat TTE in the few days.  Ambulated with physical therapy.  Complains of shortness of breath on moderate physical activity.      Past Medical History reviewed and unchanged from Admission History and Physical       Objective:     Physical Exam     Vitals:      Last 24hrs VS reviewed since prior progress note. Most recent are:    Vitals:    24 1500   BP: 119/77   Pulse: 90   Resp: 18   Temp: 98.2 °F (36.8 °C)   SpO2: 98%     SpO2 Readings from Last 6 Encounters:   24 98%          Intake/Output Summary (Last 24 hours) at 2024 1642  Last data filed at 2024 0451  Gross per 24 hour   Intake 550 ml   Output 550 ml   Net 0 ml      Exam:    General:  Awake and alert.  On room air.   Eyes:  Sclera anicteric. Pupils equal, round, reactive to light.   Mouth/Throat: Normal exam   Neck: Supple.  JVD noted   Lungs:   Few rhonchi which cleared by coughing.   Cardiovascular:  Regular rate and rhythm, no murmur, click, rub, or gallop.   Abdomen:   Soft, non-tender, bowel sounds normal, non-distended.   Extremities: No cyanosis or edema.   Skin: No acute rash or lesions.   Lymph Nodes: Cervical and supraclavicular normal.   Musculoskeletal:  No swelling or deformity.   Lines/Devices:  Intact, no erythema, drainage, or tenderness.           Vascular duplex upper extremity venous right   Final Result      XR CHEST (2 VW)   Final Result   Intact pacer leads extend to the right atrium and right ventricle.   No pneumothorax.         Electronically signed by Vinicio Purvis MD      XR CHEST 1 VIEW   Final Result   1. There is mild interstitial edema.   2. No change small left pleural effusion and 
Pulmonary Progress Note      NAME: CAROLINA CESPEDES   :  1970  MRM:  [id number]    Date/Time: 2024  4:57 PM         Subjective:     Patient seen and examined in her room on the floor this afternoon, no acute events overnight.  Doing well on the floor, BMP stable.  Saturating well on room air.  Cardiology planning repeat TTE in the few days.      Past Medical History reviewed and unchanged from Admission History and Physical       Objective:     Physical Exam     Vitals:      Last 24hrs VS reviewed since prior progress note. Most recent are:    Vitals:    24 1521   BP: 125/85   Pulse: 89   Resp: 18   Temp: 97.9 °F (36.6 °C)   SpO2: 98%     SpO2 Readings from Last 6 Encounters:   24 98%          Intake/Output Summary (Last 24 hours) at 2024 1657  Last data filed at 2024 0800  Gross per 24 hour   Intake 368.98 ml   Output --   Net 368.98 ml      Exam:    General:  Awake and alert.  On room air.   Eyes:  Sclera anicteric. Pupils equal, round, reactive to light.   Mouth/Throat: Normal exam   Neck: Supple.  JVD noted   Lungs:   Few rhonchi which cleared by coughing.   Cardiovascular:  Regular rate and rhythm, no murmur, click, rub, or gallop.   Abdomen:   Soft, non-tender, bowel sounds normal, non-distended.   Extremities: No cyanosis or edema.   Skin: No acute rash or lesions.   Lymph Nodes: Cervical and supraclavicular normal.   Musculoskeletal:  No swelling or deformity.   Lines/Devices:  Intact, no erythema, drainage, or tenderness.           Vascular duplex upper extremity venous right   Final Result      XR CHEST (2 VW)   Final Result   Intact pacer leads extend to the right atrium and right ventricle.   No pneumothorax.         Electronically signed by Vinicio Purvis MD      XR CHEST 1 VIEW   Final Result   1. There is mild interstitial edema.   2. No change small left pleural effusion and left lower lobe atelectasis      Electronically signed by FOSTER BUSTAMANTE      CTA CHEST W WO 
Pulmonary Progress Note      NAME: CAROLINA CESPEDES   :  1970  MRM:  [id number]    Date/Time: 2024  10:20 AM  Thank you for this referral  History is obtained after discussion with attending physician, review of cardiology note and information that is gathered from nurse  Patient is currently intubated and sedated.  Her nail is not clear at the time of filing of this report   Patient is a 54-year-old female and was at home and sitting and suddenly her eyes rolled back and she became unresponsive and her lips turned blue and her better half was in the home and he realized and gave her CPR and called 911.  When paramedics presented her better have continued to give CPR for about 6 minutes.  Patient is found to be in ventricular tachycardia and fibrillation and she was shocked.  Again she went into torsade the point and was given magnesium sulfate.  Patient is brought to emergency room with the CPR in progress.  We did get blood pressure and when in ER on initial evaluation was in atrial flutter with 2-1 block and cardioverted in the emergency room.  Patient was found to have STEMI on EKG.    She was revealed straightaway to cardiac cath.    She was transferred to ICU intubated and sedated.    Cardiac cath was unremarkable for significant coronary artery stenosis.  She is known to have poor ejection fraction of 10 to 15%   Currently she has good blood pressure   Vent settings are reviewed on assist-control rate of 12, breathing at rate of 16, tidal volume of 500 mL with FiO2 of 80%.    Patient is saturating 100% at bedside.  No history of chest pain or palpitation or shortness of breath prior to this episode.  Patient is reported to have history of hypertension         Subjective:     Patient seen and examined.  The patient was extubated yesterday early afternoon.  Initially on 2 L oxygen.  She was kept on Precedex infusion which was weaned off over the course of the night.  This morning she is on room air.  
Spiritual Care Assessment/Progress Note  [facility]    Name: Loy Granado Xxjeyson MRN: [id number]    Age: 89 y.o.     Sex: adult   Language:      Date: 8/4/2024            Total Time Calculated: 28 min              Spiritual Assessment begun in [facility] EMERGENCY DEPT  Service Provided For: Family  Referral/Consult From: Nurse  Encounter Overview/Reason: Crisis    Spiritual beliefs:      [] Involved in a jose f tradition/spiritual practice:      [] Supported by a jose f community:      [] Claims no spiritual orientation:      [] Seeking spiritual identity:           [] Adheres to an individual form of spirituality:      [x] Not able to assess:                Identified resources for coping and support system:   Support System: Children, Family members, Significant other       [] Prayer                  [] Devotional reading               [] Music                  [] Guided Imagery     [] Pet visits                                        [] Other: (COMMENT)     Specific area/focus of visit   Encounter:    Crisis:    Spiritual/Emotional needs: Type: Spiritual Support, Difficult news received  Ritual, Rites and Sacraments:    Grief, Loss, and Adjustments:    Ethics/Mediation:    Behavioral Health:    Palliative Care:    Advance Care Planning:      Plan/Referrals: Other (Comment) ( is available if needed)    Narrative:  responded to CODE BLUE/STEMI. Pt is being cared for by the medical team. Family arrives; they are emotional and tearful.  provided tissues as needed.  escorts the family to the hallway outside of pt's room to speak with the physician per the physician's request.  stands silently while physician consults with the family. Pt is taken for a medical procedure.  escorts family to Same Day waiting area. Family express their gratitude for the support.     invited family to share their feelings/concerns; acknowledged and validated their feelings. 
This morning I made a medication error when administering the patient IV fentanyl for pain. My assignment included the patients in rooms 276 and 277, both of which have PRN doses of Fentanyl. 276 has 25 mcg q1h PRN and 277 has 50 mcg q2h PRN.     The error has been reported to Monisha Fallon, Terri Ye, Melanie Barber, and Rosmery Flores. [employer] report also filed.     Events of Error:   The pt in 276 required her PRN dose. I accidentally pulled the medication under the pt in 277. Initially there were no nurses to witness so I exited my profile. Upon leaving another RN, Adal, came into the hallway so I asked her to witness the waste. She went into the breakroom to set her things down and I went into the [employer] room. I logged back into the [employer] and went to waste the meds under 276. Because I had pulled the med under the wrong patient which I hadn't realized yet, we had to waste the med from the stocked meds list instead of the \"meds to waste\" list. We wasted what I thought was supposed to be 25 mcg based on the order I pulled from. I went back to room 276 to administer the dose. I thought I had scanned the medication after removing it from the outer casing but when reviewing the MAR after the dose was given I noticed the administration record was not there. I had already disposed of the syringe in the sharps container so I had to override the scan to document the dose given. At that time I realized there was an error because I was prompted to override the dose of 25 mcg which I proceeded to do for clear documentation. Once I left the room I went back to the [employer] to find the error and there was an immediate reminder when I logged in that I still had medications to waste. It was then I realized I pulled the med from the wrong patient (277).   
VSS. Patient given discharge instructions. Medications and follow-up appointments reviewed. IV and Telemetry removed. Case management, provider, and primary nurse aware of discharge. Discharge plan of care/case management plan validated with provider discharge order. Patient taken out via wheelchair.  
Hearing grossly normal.  Neck: Supple.  Chest: No rales or rhonchi's.  Cardiovascular: Regular rate and rhythm, S1,S2 normal, no murmur.  Abdomen: Soft, non-tender, bowel sounds are active.  Extremities: No edema bilaterally.  Skin: Warm and dry.    [x]         Post-cath site without hematoma, bruit, tenderness, or thrill.  Distal pulses intact.    PMH/ reviewed - no change compared to H&P    Data Review    Telemetry: Sinus rhythm and episodes of short run of V. tach    EKG:   []  No new EKG for review    Lab Data Personally Reviewed:    Recent Labs     08/14/24  0153   WBC 13.2*   HGB 13.1   HCT 39.7          Recent Labs     08/11/24  1321   APTT 36.5*      Recent Labs     08/12/24  0327 08/14/24  0153    137   K Hemolyzed, Recollection Recommended 3.9    106   CO2 25 26   BUN 14 14   CREATININE 0.73 0.76   GLUCOSE 99 110*   CALCIUM 9.2 9.7   MG Hemolyzed, Recollection Recommended  --      No results for input(s): \"CKTOTAL\", \"CKMB\", \"TROPONINI\", \"BNP\", \"PROBNP\", \"NTPROBNP\" in the last 72 hours.    Invalid input(s): \"CKINDEX\"  No results found for: \"CHOL\", \"TRIG\", \"HDL\", \"VLDL\", \"CHOLHDLRATIO\"    No results for input(s): \"AST\", \"ALKPHOS\", \"LABPROT\", \"LABALBU\", \"GLOB\", \"GGT\", \"AMYLASE\", \"LIPASE\" in the last 72 hours.    Invalid input(s): \"GPT\", \"TBILITOT\"    No results for input(s): \"PH\", \"PCO2\", \"PO2\" in the last 72 hours.    Medications Personally Reviewed:    Current Facility-Administered Medications   Medication Dose Route Frequency    apixaban (ELIQUIS) tablet 10 mg  10 mg Oral BID    Followed by    [START ON 8/18/2024] apixaban (ELIQUIS) tablet 5 mg  5 mg Oral BID    doxycycline hyclate (VIBRAMYCIN) capsule 100 mg  100 mg Oral 2 times per day    furosemide (LASIX) tablet 40 mg  40 mg Oral Daily    amiodarone (CORDARONE) tablet 200 mg  200 mg Oral Daily    carvedilol (COREG) tablet 12.5 mg  12.5 mg Oral BID     sacubitril-valsartan (ENTRESTO) 49-51 MG per tablet 1 tablet  1 tablet Oral BID 
injection vial 0-8 Units  0-8 Units SubCUTAneous TID WC Hiro Hannon MD        insulin lispro (HUMALOG,ADMELOG) injection vial 0-4 Units  0-4 Units SubCUTAneous Nightly Hiro Hannon MD        glucose chewable tablet 16 g  4 tablet Oral PRN Hiro Hannon MD        dextrose bolus 10% 125 mL  125 mL IntraVENous PRN Hiro Hannon MD        Or    dextrose bolus 10% 250 mL  250 mL IntraVENous PRN Hiro Hannon MD        glucagon injection 1 mg  1 mg SubCUTAneous PRN Hiro Hannon MD        dextrose 10 % infusion   IntraVENous Continuous PRN Hiro Hannon MD        spironolactone (ALDACTONE) tablet 25 mg  25 mg Oral Daily Sha Salas MD   25 mg at 08/11/24 0757    atorvastatin (LIPITOR) tablet 40 mg  40 mg Oral Nightly Sha Salas MD   40 mg at 08/10/24 2146    aspirin chewable tablet 81 mg  81 mg Oral Daily Sha Salas MD   81 mg at 08/11/24 0757    sodium chloride flush 0.9 % injection 5-40 mL  5-40 mL IntraVENous 2 times per day Sha Salas MD   10 mL at 08/11/24 0802    sodium chloride flush 0.9 % injection 5-40 mL  5-40 mL IntraVENous PRN Sha Salas MD        0.9 % sodium chloride infusion   IntraVENous PRN Sha Salas MD 5 mL/hr at 08/05/24 0738 Rate Verify at 08/05/24 0738    acetaminophen (TYLENOL) tablet 650 mg  650 mg Oral Q4H PRN Sha Salas MD   650 mg at 08/10/24 2146         Allergies  Allergies   Allergen Reactions    Ace Inhibitors Angioedema       REVIEW OF SYSTEMS     Within above limitations. 14 point review of systems reviewed. Pertinent positive or negative as per HPI or otherwise negative per 14 point systems review.     Reviewed 8/11/2024 at 3:48 PM    PHYSICAL EXAM       Blood pressure 125/85, pulse 89, temperature 97.9 °F (36.6 °C), temperature source Oral, resp. rate 18, height 1.676 m (5' 6\"), weight 74.6 kg (164 lb 7.4 oz), SpO2 98%.    General: Patient is awake, alert, oriented with appropriate mood and affect  HEENT: 
  CTA CHEST W WO CONTRAST    (Results Pending)   US ABDOMEN COMPLETE    (Results Pending)       Lab Data Reviewed: (see below)      [facility]  [facility]    ______________________________________________________________________      Lab Review:     Recent Labs     08/04/24  1318 08/05/24  0122   WBC 14.6* 15.1*   HGB 15.2 15.3   HCT 46.8 46.6    381     Recent Labs     08/04/24  1318 08/05/24  0122    139   K 3.5 4.4    108   CO2 18* 24   BUN 17 21*   MG 2.9*  --    * 387*     No components found for: \"GLPOC\"  No results for input(s): \"PH\", \"PCO2\", \"PO2\", \"HCO3\", \"FIO2\" in the last 72 hours.  No results for input(s): \"INR\", \"INREXT\" in the last 72 hours.    Other pertinent lab:          Assessment & Plan:      This is a middle-aged female with known history of hypertension.  Apparently she was at home and then all of a sudden she had witnessed cardiac arrest with eye balls following up.  CPR was started by her .  EMS arrived and patient had return of spontaneous circulation within 10 minutes.  Initial rhythm was ventricular fibrillation received 4 defibrillations with cardioversion.  She was not torsade de point.  EKG showed ST segment elevation MI.  She was taken right away to cardiac cath.  No coronary artery stenosis was noted     Plan:   #1. acute respiratory failure  Resulted because of acute cardiac arrest.  Patient is currently sedated with propofol at 50 mics.  However she intermittently wakes up and reaches with the tube.  Sedation application not done yet.  Ventilator day #2.  Blood gases this morning showing 7.4 0/35/112 on 40% ventilator.  Current vent settings are 400, O2 30%, rate of 16 and PEEP of 5.  AC/VC mode.  Will decrease rate to 14.    Chest x-ray shows no acute process.    If cleared by cardiology will proceed with weaning and extubation hopefully today.  However the patient has a history of alcoholism.  She will need Precedex for transition.    2.  ST 
ointment   Topical PRN    famotidine (PEPCID) tablet 20 mg  20 mg Oral BID    thiamine (B-1) injection 100 mg  100 mg IntraVENous Daily    spironolactone (ALDACTONE) tablet 25 mg  25 mg Oral Daily    atorvastatin (LIPITOR) tablet 40 mg  40 mg Oral Nightly    aspirin chewable tablet 81 mg  81 mg Oral Daily    sodium chloride flush 0.9 % injection 5-40 mL  5-40 mL IntraVENous 2 times per day    sodium chloride flush 0.9 % injection 5-40 mL  5-40 mL IntraVENous PRN    0.9 % sodium chloride infusion   IntraVENous PRN    acetaminophen (TYLENOL) tablet 650 mg  650 mg Oral Q4H PRN           Problem List:   Cardiac arrest, ventricular fibrillation and tachycardia.  Probably dilated cardiomyopathy, nonischemic.  Moderate aortic regurgitation.  Hypertension.  Noncompliance.  Patient may be diabetic.  Blood pressure is labile and is very high.  May need to evaluate for small possibility of pheochromocytoma.  Patient has had some memory lapses but has improved.       Assessment/Plan:   I will get serum metanephrine level.   Will consider conventional heart failure treatment.  Will check serial EKG and enzymes.    Her condition has improved significantly but still remains critical.  I will probably repeat echocardiogram and will also recommend cardiac rehab.  Patient may be able to be discharged tomorrow.  Thank you.           [x]       High complexity decision making was performed in this patient at high risk for decompensation with multiple organ involvement.    Sha Salas MD                 
tomorrow    Moderate aortic insufficiency  Long-term, untreated systemic hypertension  Blood pressure currently well-controlled on the her regimen    History of alcohol use  Mild acute kidney injury due to above  Renal function stable  Monitor for now    Transaminitis, unclear etiology, possibly due to shock liver versus intrinsic liver disease  -No abdominal pain, patient tolerating well p.o. diet  Reactive leukocytosis  Anterograde amnesia, concern for hypoxic brain injury.            [facility]  Hospitalist, Internal Medicine  8/12/2024 at 2:40 PM

## 2024-08-14 NOTE — DISCHARGE SUMMARY
focal pulmonary process. The patient was then rushed to the cardiac catheter lab by cardiology, the patient was found to have no blockages in her coronary arteries.  The patient then proceeded to have a stat echocardiogram which showed 25% EF, moderate aortic insufficiency, no aortic dissection.  Findings likely secondary to undiagnosed cardiomyopathy.     Per the patient's family, the patient has a history of hypertension, but is noncompliant with her medications.  The patient drinks 1 to 2 cans of \"spirit\" a night.  They report that the patient has no history of smoking, or illicit drug use.  They also indicate a significant family history of CHF, lung cancer.       ______________________________________________________________________  DISCHARGE SUMMARY/HOSPITAL COURSE:  for full details see H&P, daily progress notes, labs, consult notes.     Patient was ultimately admitted to [facility] for evaluation as well as management, patient was eval by cardiology, underwent cardiac catheterization, had AICD placed, overall patient's clinical status improved, noted to have upper extremity DVT, started on heparin GTT, transition to Eliquis, patient was noted to have acute decompensated heart failure with systolic dysfunction on transthoracic echo, patient had repeat echocardiogram, valvular cardiology, patient's medications were optimized, following which after clearance by cardiology patient was deemed stable for discharge home with home health with close outpatient follow-up with primary care physician as well as cardiology as well as pulmonology.        _______________________________________________________________________  Patient seen and examined by me on discharge day.  Pertinent Findings:  Gen:    Not in distress  Chest: Clear lungs  CVS:   Regular rhythm, s1/s2 no m/r/g  No edema  Abd:  Soft, not distended, not tender  Neuro:  Alert, Oriented at

## 2024-08-14 NOTE — CARE COORDINATION
Patient is cleared to discharge from Cardiology stand. Patient has been set up with out patient cardiac rehab.      Transition of Care Plan:    RUR: 5%  Prior Level of Functioning: IND  Disposition: Out Patient Cardiac Rehab.   If SNF or IPR: Date FOC offered:   Date FOC received:   Accepting facility:   Date authorization started with reference number:   Date authorization received and expires:   Follow up appointments:   DME needed:   Transportation at discharge:   [facility] Medicare/ letter given: NA  Is patient a [place] and connected with [facility]? NA   If yes, was  transfer form completed and [facility] notified? NA  Caregiver Contact:  Payton Taylor [phone]  Discharge Caregiver contacted prior to discharge? No, patient is alert and oriented.   Care Conference needed? no  Barriers to discharge: None.

## 2024-08-21 ENCOUNTER — OFFICE VISIT (OUTPATIENT)
Age: 54
End: 2024-08-21
Payer: COMMERCIAL

## 2024-08-21 VITALS
DIASTOLIC BLOOD PRESSURE: 72 MMHG | WEIGHT: 162.5 LBS | BODY MASS INDEX: 26.12 KG/M2 | HEIGHT: 66 IN | SYSTOLIC BLOOD PRESSURE: 112 MMHG

## 2024-08-21 DIAGNOSIS — Z12.31 SCREENING MAMMOGRAM FOR BREAST CANCER: Primary | ICD-10-CM

## 2024-08-21 DIAGNOSIS — Z12.4 PAP SMEAR FOR CERVICAL CANCER SCREENING: ICD-10-CM

## 2024-08-21 DIAGNOSIS — N95.1 MENOPAUSAL SYNDROME: ICD-10-CM

## 2024-08-21 DIAGNOSIS — Z01.419 GYNECOLOGIC EXAM NORMAL: ICD-10-CM

## 2024-08-21 PROCEDURE — 99396 PREV VISIT EST AGE 40-64: CPT | Performed by: OBSTETRICS & GYNECOLOGY

## 2024-08-21 PROCEDURE — 3074F SYST BP LT 130 MM HG: CPT | Performed by: OBSTETRICS & GYNECOLOGY

## 2024-08-21 PROCEDURE — 3078F DIAST BP <80 MM HG: CPT | Performed by: OBSTETRICS & GYNECOLOGY

## 2024-08-21 ASSESSMENT — ENCOUNTER SYMPTOMS
GASTROINTESTINAL NEGATIVE: 1
RESPIRATORY NEGATIVE: 1

## 2024-08-21 NOTE — PROGRESS NOTES
Chief Complaint   Patient presents with    Annual Exam     Aqndt-3-07-22     /72 (Site: Left Upper Arm, Position: Sitting, Cuff Size: Small Adult)   Ht 1.676 m (5' 6\")   Wt 73.7 kg (162 lb 8 oz)   LMP 11/01/2022 (Approximate)   BMI 26.23 kg/m²

## 2024-08-21 NOTE — PROGRESS NOTES
Evangelina Taylor is a No obstetric history on file., 53 y.o. female   Patient's last menstrual period was 11/01/2022 (approximate).    She presents for her annual    She is having no significant problems.      Menstrual status:  Cycles are menopausal.    Flow: absent.      She does not have dysmenorrhea.      Medical conditions:  Since her last annual GYN exam about one year ago, she has not the following changes in her health history: none.     Mammogram History:    MARGUERITE Results (most recent):  @[facility]([id number])@     [facility] ([facility]  [id number]       Past Medical History:   Diagnosis Date    Cervical cyst 9/12/2020    Cervical dysplasia 9/12/2020    HTN (hypertension)     Hypertension     Does not take meds    Irregular periods 9/12/2020    Lesion of neck 9/12/2020    Postmenopausal 11/01/2022    Uterine prolapse 9/12/2020     Past Surgical History:   Procedure Laterality Date    CARDIAC PROCEDURE N/A 8/4/2024    Left heart cath / coronary angiography performed by Sha Salas MD at [facility] CARDIAC CATH LAB    CARDIAC PROCEDURE N/A 8/4/2024    Aortic root aortogram performed by Sha Salas MD at [facility] CARDIAC CATH LAB    EP DEVICE PROCEDURE N/A 8/7/2024    Insert ICD dual performed by Hallie Hinojosa MD at [facility] ELECTROPHYSIOLOGY    GYN      colpo    GYN      cryo    INVASIVE VASCULAR N/A 8/7/2024    Venogram upper ext left performed by Hallie Hinojosa MD at [facility] ELECTROPHYSIOLOGY    TONSILLECTOMY AND ADENOIDECTOMY         Prior to Admission medications    Medication Sig Start Date End Date Taking? Authorizing Provider   amiodarone (CORDARONE) 200 MG tablet Take 1 tablet by mouth daily 8/15/24  Yes Domenico Borges MD   apixaban (ELIQUIS) 5 MG TABS tablet Take 2 tablets by mouth 2 times daily for 4 days, THEN 1 tablet 2 times daily. 8/14/24 9/17/24 Yes Domenico Borges MD   aspirin 81 MG chewable tablet Take 1 tablet by mouth daily 8/15/24  Yes Domenico Borges

## 2024-08-27 LAB
., LABCORP: NORMAL
CYTOLOGIST CVX/VAG CYTO: NORMAL
CYTOLOGY CVX/VAG DOC CYTO: NORMAL
CYTOLOGY CVX/VAG DOC THIN PREP: NORMAL
DX ICD CODE: NORMAL
Lab: NORMAL
Lab: NORMAL
OTHER STN SPEC: NORMAL
STAT OF ADQ CVX/VAG CYTO-IMP: NORMAL

## 2025-06-04 ENCOUNTER — TRANSCRIBE ORDERS (OUTPATIENT)
Facility: HOSPITAL | Age: 55
End: 2025-06-04

## 2025-06-04 DIAGNOSIS — I51.9 HEART DISEASE, UNSPECIFIED: Primary | ICD-10-CM

## (undated) DEVICE — INTRODUCER SHTH L13CM OD7FR SH ORNG HUB SEAMLESS SAFSHTH

## (undated) DEVICE — SYRINGE 20ML LL S/C 50

## (undated) DEVICE — CATHETER GUID 6FR 0.071IN COR AMPLATZ L 0.75 MID

## (undated) DEVICE — TOWEL,OR,DSP,ST,BLUE,DLX,6/PK,12PK/CS: Brand: MEDLINE

## (undated) DEVICE — PINNACLE INTRODUCER SHEATH: Brand: PINNACLE

## (undated) DEVICE — SUTURE ABSORBABLE 2-0 CT-1 9 IN 36 MM VIO STRATAFIX PDS + SXPP1A423

## (undated) DEVICE — CATHETER DIAG 6FR L100CM SPEC 3 DRC CRV SZ DBL BRAID WIRE

## (undated) DEVICE — SUTURE ETHIBOND EXCEL SZ 0 L30IN NONABSORBABLE GRN L26MM CT-2 X412H

## (undated) DEVICE — SUTURE VICRYL + SZ 2-0 L36IN ABSRB UD L36MM CT-1 1/2 CIR VCP945H

## (undated) DEVICE — BASIXCOMPAK INDEFLATOR

## (undated) DEVICE — 3M™ STERI-STRIP™ REINFORCED ADHESIVE SKIN CLOSURES, R1547, 1/2 IN X 4 IN (12 MM X 100 MM), 6 STRIPS/ENVELOPE: Brand: 3M™ STERI-STRIP™

## (undated) DEVICE — 3M™ TEGADERM™ TRANSPARENT FILM DRESSING FRAME STYLE, 1626W, 4 IN X 4-3/4 IN (10 CM X 12 CM), 50/CT 4CT/CASE: Brand: 3M™ TEGADERM™

## (undated) DEVICE — PRESSURE TUBING: Brand: TRUWAVE

## (undated) DEVICE — INTRODUCER SPLIT SHEATH PRELUDE SNAP 9FR X 13CM

## (undated) DEVICE — CABLE PACE LNG L12IN CHN A OR V SM CLP EPG TEMP DISP

## (undated) DEVICE — TUBING, SUCTION, 3/16" X 10', SCALLOP: Brand: MEDLINE

## (undated) DEVICE — 150CM STANDARD JWIRE

## (undated) DEVICE — SURGICAL PROCEDURE TRAY CRD CATH 3 PRT

## (undated) DEVICE — CONTAINER,SPECIMEN,PNEU TUBE,4OZ,OR STRL: Brand: MEDLINE

## (undated) DEVICE — LIGHT HANDLE COVER: Brand: UNBRANDED

## (undated) DEVICE — SUTURE ABSORBABLE ANTIBACT 4-0 SH-1 9 IN VIO PDS + SXPP1B454

## (undated) DEVICE — CARDINAL HEALTH LAP SPONGE  4 X 18 IN. PREWASHED X RAY DETECTABLE SOFTPACK: Brand: CARDINAL HEALTH

## (undated) DEVICE — PAD N ADH W3XL4IN POLY COT SFT PERF FLM EASILY CUT ABSRB

## (undated) DEVICE — ANGIO-SEAL VIP VASCULAR CLOSURE DEVICE: Brand: ANGIO-SEAL

## (undated) DEVICE — LIMB HOLDER, WRIST/ANKLE: Brand: DEROYAL

## (undated) DEVICE — 3M™ IOBAN™ 2 ANTIMICROBIAL INCISE DRAPE 6650EZ: Brand: IOBAN™ 2

## (undated) DEVICE — BOWL MED M 16OZ PLAS CAP GRAD

## (undated) DEVICE — CATHETER ETER VASC OD6FR CARD 145DEG PGTL BRAID JL40 JR40 MP

## (undated) DEVICE — DRAPE EQUIP C ARM 74X42 IN MOB XR W/ TIE RUBBER BND LF

## (undated) DEVICE — PACER PACK: Brand: MEDLINE INDUSTRIES, INC.

## (undated) DEVICE — ADHESIVE SKIN CLSR 0.7ML TOP DERMBND ADV